# Patient Record
Sex: FEMALE | Race: WHITE | NOT HISPANIC OR LATINO | Employment: UNEMPLOYED | URBAN - NONMETROPOLITAN AREA
[De-identification: names, ages, dates, MRNs, and addresses within clinical notes are randomized per-mention and may not be internally consistent; named-entity substitution may affect disease eponyms.]

---
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
## 2023-04-07 RX ORDER — ETONOGESTREL/ETHINYL ESTRADIOL .12-.015MG
RING, VAGINAL VAGINAL
Qty: 1 EACH | Refills: 1 | Status: SHIPPED | OUTPATIENT
Start: 2023-04-07

## 2023-05-22 ENCOUNTER — OFFICE VISIT (OUTPATIENT)
Dept: FAMILY MEDICINE CLINIC | Facility: CLINIC | Age: 18
End: 2023-05-22
Payer: COMMERCIAL

## 2023-05-22 DIAGNOSIS — F41.9 ANXIETY: ICD-10-CM

## 2023-05-22 DIAGNOSIS — K59.00 CONSTIPATION, UNSPECIFIED CONSTIPATION TYPE: ICD-10-CM

## 2023-05-22 DIAGNOSIS — Z00.00 ROUTINE GENERAL MEDICAL EXAMINATION AT A HEALTH CARE FACILITY: Primary | ICD-10-CM

## 2023-05-22 NOTE — PROGRESS NOTES
Female Physical Note      Date: 2023   Patient Name: Mikayla Nascimento  : 2005   MRN: [id number]     Chief Complaint:    Chief Complaint   Patient presents with   • Annual Exam       History of Present Illness: Mikayla Nascimento is a 18 y.o. female who is here today for their annual health maintenance and physical.  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  She is currently prescribed 50 mg of Zoloft and NuvaRing.  She reports that she is tolerating both well and denies any side effects or concerns with her medications.  She denies any lingering anxiety or depression symptoms.  She denies any suicidal ideation.  She does report that when she experiences anxiety, she has physical manifestations such as chest pain.  She reports that she has not had episodes of chest pain or panic attack for multiple months.  She does have significant life stressors as her father has stage IV lung cancer.  She has been on Zoloft since .     She does report occasionally having some issues with constipation.  She reports that she had an episode of constipation 3 weeks ago.  Her last bowel movement was 2 days ago.  She denies any abdominal pain at present time.  She denies any other acute health concerns at present time.      Subjective      Review of Systems:   Review of Systems   Constitutional: Negative for appetite change, chills, fatigue, fever, unexpected weight gain and unexpected weight loss.   HENT: Negative for congestion, rhinorrhea, sore throat and trouble swallowing.    Eyes: Negative for blurred vision and double vision.   Respiratory: Negative for cough, chest tightness, shortness of breath and wheezing.    Cardiovascular: Negative for chest pain, palpitations and leg swelling.   Gastrointestinal: Positive for constipation. Negative for abdominal pain, anal bleeding, blood in stool, diarrhea, nausea and vomiting.   Genitourinary: Negative for dyspareunia, dysuria,  frequency, hematuria, menstrual problem, urinary incontinence, vaginal discharge and vaginal pain.   Musculoskeletal: Negative for arthralgias and myalgias.   Skin: Negative for rash.   Neurological: Negative for dizziness, syncope, weakness, light-headedness, numbness and headache.   Hematological: Does not bruise/bleed easily.   Psychiatric/Behavioral: Negative for self-injury, sleep disturbance, suicidal ideas and depressed mood. The patient is not nervous/anxious.        Past Medical History, Social History, Family History and Care Team were all reviewed with patient and updated as appropriate.     Medications:     Current Outpatient Medications:   •  NuvaRing 0.12-0.015 MG/24HR vaginal ring, INSERT 1 RING VAGINALLY FOR 3 WEEKS THEN REMOVE FOR 1 WEEK, Disp: 1 each, Rfl: 1  •  sertraline (ZOLOFT) 50 MG tablet, Take 1.5 tablets by mouth Daily., Disp: 45 tablet, Rfl: 6    Allergies:   No Known Allergies    Immunizations:  Health Maintenance Summary          Ordered - HEPATITIS C SCREENING (Once) Ordered on 5/22/2023    No completion, postpone, or frequency change history exists for this topic.          Overdue - COVID-19 Vaccine (4 - Booster for Pfizer series) Overdue since 1/31/2022 11/08/2022  Postponed until 2/28/2023 by Kelly Crain PA-C (Patient Refused)    12/06/2021  Imm Admin: COVID-19 (PFIZER) Purple Cap Monovalent    04/29/2021  Imm Admin: COVID-19 (PFIZER) PURPLE CAP    04/07/2021  Imm Admin: COVID-19 (PFIZER) PURPLE CAP          Ordered - CHLAMYDIA SCREENING (Yearly) Ordered on 5/22/2023    12/10/2021  Chlamydia trachomatis, JASEN component of NuSwab VG+ - Swab, Vagina    11/01/2018  Chlamydia trachomatis, JASEN component of NuSwab VG+ - Swab, Vagina          INFLUENZA VACCINE (Yearly - August to March) Next due on 8/1/2023 11/08/2022  Imm Admin: FluLaval/Fluzone >6mos    12/06/2021  Imm Admin: Influenza, Unspecified    12/06/2021  Imm Admin: Influenza Injectable Mdck Pf Quad    10/24/2018   Imm Admin: FluLaval/Fluzone >6mos          TDAP/TD VACCINES (2 - Td or Tdap) Next due on 3/21/2024    03/21/2014  Imm Admin: Tdap          ANNUAL PHYSICAL (Yearly) Next due on 5/22/2024 05/22/2023  Done          HPV VACCINES (Series Information) Completed    04/13/2017  Imm Admin: Hpv9    11/01/2016  Imm Admin: Hpv9    08/02/2016  Imm Admin: Hpv9          MENINGOCOCCAL VACCINE (Series Information) Completed    02/13/2019  Imm Admin: Meningococcal Conjugate          Pneumococcal Vaccine 0-64 (Series Information) Aged Out    No completion, postpone, or frequency change history exists for this topic.                 No orders of the defined types were placed in this encounter.       Colorectal Screening:  N/A  Last Completed Colonoscopy     This patient has no relevant Health Maintenance data.        Pap: No previous Pap       Last Completed Pap Smear     This patient has no relevant Health Maintenance data.         Mammogram: N/A  Last Completed Mammogram     This patient has no relevant Health Maintenance data.           CT for Smoker (Age 50-80, 20 pk yr): N/A  Bone Density/DEXA (Age 65 or high risk): N/A  Hep C (Age 18-79 once): Ordered today  HIV (Age 15-65 once): No results found for: HIV1X2  A1c: Ordered today  Lipid panel: Ordered today    The ASCVD Risk score (Anjana SOW, et al., 2019) failed to calculate for the following reasons:    The 2019 ASCVD risk score is only valid for ages 40 to 79    Dermatology: not regularly, no skin concerns at present time  Optometrist: Yearly, wears glasses  Dentist: Every 6 months  Seatbelt: Always wears    Tobacco Use: Low Risk    • Smoking Tobacco Use: Never   • Smokeless Tobacco Use: Never   • Passive Exposure: Never       Social History     Substance and Sexual Activity   Alcohol Use No        Social History     Substance and Sexual Activity   Drug Use Never        Diet/Physical activity: Diet: lots of fruits, home cooked meals, well-balanced, no excessive caffeine,  "drinks mainly water, Physical activity: walks for physical activity     Sexual Health: One time sexual partner, no new partner, no concerns for STIs, denies vaginal discharge, vaginal pain, postcoital bleeding  Menopause: N/A  Menstrual Cycles: Currently using NuvaRing for contraception, she is happy with her current method of contraception, reports periods are occurring regularly on NuvaRing, she is currently on her period, sees Dr. Parra health concerns    Depression: PHQ-2 Depression Screening  PHQ-9 Total Score: 0     Measures:   Advanced Care Planning:   Patient does not have an advance directive, declines further assistance.    Smoking Cessation:   N/A    Objective     Physical Exam:  Vital Signs:   Vitals:    05/22/23 1339   BP: 110/62   BP Location: Left arm   Patient Position: Sitting   Cuff Size: Adult   Pulse: 82   Resp: 16   Temp: 97 °F (36.1 °C)   TempSrc: Temporal   SpO2: 100%   Weight: 54.9 kg (121 lb)   Height: 157.5 cm (62\")     Body mass index is 22.13 kg/m².     Physical Exam  Vitals and nursing note reviewed.   Constitutional:       General: She is not in acute distress.     Appearance: Normal appearance. She is not ill-appearing, toxic-appearing or diaphoretic.   HENT:      Head: Atraumatic.   Eyes:      Extraocular Movements: Extraocular movements intact.   Neck:      Comments: No thyromegaly or masses appreciated.  Cardiovascular:      Rate and Rhythm: Normal rate and regular rhythm.      Heart sounds: No murmur heard.    No friction rub. No gallop.   Pulmonary:      Effort: Pulmonary effort is normal. No respiratory distress.      Breath sounds: No wheezing, rhonchi or rales.   Abdominal:      General: There is no distension.      Palpations: Abdomen is soft.      Tenderness: There is no abdominal tenderness. There is no guarding or rebound.   Musculoskeletal:      Cervical back: Normal range of motion.      Right lower leg: No edema.      Left lower leg: No edema.   Skin:     General: Skin is " warm.   Neurological:      Mental Status: She is alert.      Gait: Gait normal.   Psychiatric:         Mood and Affect: Mood normal.         Thought Content: Thought content normal.         Assessment / Plan      Assessment/Plan:   Diagnoses and all orders for this visit:    1. Routine general medical examination at a health care facility (Primary)  -Routine health maintenance labs ordered at present time.  -BMI is within normal range.  -Physical exam within normal limits.  -Did discuss age-appropriate preventative health counseling such as abstinence from excessive alcohol use, tobacco use, and drug use, symptoms of STIs, adherence to NuvaRing for protection against unwanted pregnancy, importance of daily sunscreen use, importance of daily seatbelt use, abstinence from texting and driving and drinking and driving, never getting in the car with a friend who has been drinking, mental health, importance of yearly optometry appointments and twice yearly dental appointments etc.  -Patient verbalizes understanding and has no further questions.  -     CBC (No Diff); Future  -     Comprehensive Metabolic Panel; Future  -     Hemoglobin A1c; Future  -     Hepatitis C Antibody; Future  -     Lipid Panel; Future  -     TSH Rfx On Abnormal To Free T4; Future  -     Vitamin B12; Future  -     Urinalysis With Culture If Indicated -; Future  -     Chlamydia trachomatis, Neisseria gonorrhoeae, PCR - Urine, Urine, Random Void; Future  -     CBC (No Diff)  -     Comprehensive Metabolic Panel  -     Hemoglobin A1c  -     Hepatitis C Antibody  -     Lipid Panel  -     TSH Rfx On Abnormal To Free T4  -     Vitamin B12  -     Urinalysis With Culture If Indicated -  -     Chlamydia trachomatis, Neisseria gonorrhoeae, PCR - Urine, Urine, Random Void    2. Constipation, unspecified constipation type  -Patient does report issues with intermittent constipation.  -We did discuss lifestyle modifications to help with constipation such as  increasing fluid intake and increasing physical activity.  -We discussed that if these 2 measures alone do not help to relieve constipation, she may benefit from MiraLAX use.  We discussed mechanism of action of MiraLAX and how to adjust dose of medication to find dose that works well for her.  -If she continues to have issues or if symptoms are not relieved with these measures, I encouraged her to return to care.  -She verbalizes understanding and has no further questions.    3. Anxiety    -PHQ-2 Total Score: 0  -NORMA 7 Total Score: 7   -Denies SI/HI.  -PHQ-9 and NORMA-7 scores are within acceptable range.  We did discuss possibly dose escalating Zoloft, however at this time she would like to remain on current dose of Zoloft.  -She has been on Zoloft for several years and has tolerated it well.  -We will keep patient on current dose of Zoloft.  -I encouraged the patient to return to care sooner than follow-up appointment if symptoms worsen or new symptoms develop.  -Patient verbalizes understanding has no further questions.    Healthcare Maintenance:  Counseling provided based on age appropriate USPSTF guidelines.  BMI is within normal parameters. No other follow-up for BMI required.    Mikayla Nascimento voices understanding and acceptance of this advice and will call back with any further questions or concerns. AVS with preventive healthcare tips printed for patient.     Follow Up:   Return in about 6 months (around 11/22/2023) for Recheck.      At [facility], we believe that sharing information builds trust and better relationships. You are receiving this note because you recently visited [facility]. It is possible you will see health information before a provider has talked with you about it. This kind of information can be easy to misunderstand. To help you fully understand what it means for your health, we urge you to discuss this note with your provider.    Mireya Crain PA-C  [facility]

## 2023-05-23 ENCOUNTER — OFFICE VISIT (OUTPATIENT)
Dept: FAMILY MEDICINE CLINIC | Facility: CLINIC | Age: 18
End: 2023-05-23
Payer: COMMERCIAL

## 2023-05-23 VITALS
RESPIRATION RATE: 16 BRPM | HEIGHT: 62 IN | WEIGHT: 121 LBS | DIASTOLIC BLOOD PRESSURE: 70 MMHG | OXYGEN SATURATION: 100 % | BODY MASS INDEX: 22.26 KG/M2 | SYSTOLIC BLOOD PRESSURE: 118 MMHG | HEART RATE: 72 BPM | TEMPERATURE: 98 F

## 2023-05-23 VITALS
WEIGHT: 121 LBS | OXYGEN SATURATION: 100 % | SYSTOLIC BLOOD PRESSURE: 110 MMHG | HEIGHT: 62 IN | HEART RATE: 82 BPM | TEMPERATURE: 97 F | DIASTOLIC BLOOD PRESSURE: 62 MMHG | RESPIRATION RATE: 16 BRPM | BODY MASS INDEX: 22.26 KG/M2

## 2023-05-23 DIAGNOSIS — J02.0 STREP PHARYNGITIS: Primary | ICD-10-CM

## 2023-05-23 LAB
ALBUMIN SERPL-MCNC: 4.3 G/DL (ref 3.5–5.2)
ALBUMIN/GLOB SERPL: 1.4 G/DL
ALP SERPL-CCNC: 58 U/L (ref 39–117)
ALT SERPL W P-5'-P-CCNC: 10 U/L (ref 1–33)
ANION GAP SERPL CALCULATED.3IONS-SCNC: 7.9 MMOL/L (ref 5–15)
AST SERPL-CCNC: 15 U/L (ref 1–32)
BILIRUB SERPL-MCNC: 0.3 MG/DL (ref 0–1.2)
BUN SERPL-MCNC: 9 MG/DL (ref 6–20)
BUN/CREAT SERPL: 12.7 (ref 7–25)
CALCIUM SPEC-SCNC: 9.4 MG/DL (ref 8.6–10.5)
CHLORIDE SERPL-SCNC: 104 MMOL/L (ref 98–107)
CHOLEST SERPL-MCNC: 167 MG/DL (ref 0–200)
CO2 SERPL-SCNC: 28.1 MMOL/L (ref 22–29)
CREAT SERPL-MCNC: 0.71 MG/DL (ref 0.57–1)
EGFRCR SERPLBLD CKD-EPI 2021: 125 ML/MIN/1.73
EXPIRATION DATE: ABNORMAL
GLOBULIN UR ELPH-MCNC: 3 GM/DL
GLUCOSE SERPL-MCNC: 68 MG/DL (ref 65–99)
HBA1C MFR BLD: 4.8 % (ref 4.8–5.6)
HCV AB SER DONR QL: NORMAL
HDLC SERPL-MCNC: 53 MG/DL (ref 40–60)
INTERNAL CONTROL: ABNORMAL
LDLC SERPL CALC-MCNC: 97 MG/DL (ref 0–100)
LDLC/HDLC SERPL: 1.8 {RATIO}
Lab: ABNORMAL
POTASSIUM SERPL-SCNC: 3.9 MMOL/L (ref 3.5–5.2)
PROT SERPL-MCNC: 7.3 G/DL (ref 6–8.5)
S PYO AG THROAT QL: POSITIVE
SODIUM SERPL-SCNC: 140 MMOL/L (ref 136–145)
TRIGL SERPL-MCNC: 92 MG/DL (ref 0–150)
TSH SERPL DL<=0.05 MIU/L-ACNC: 0.61 UIU/ML (ref 0.27–4.2)
VIT B12 BLD-MCNC: 589 PG/ML (ref 211–946)
VLDLC SERPL-MCNC: 17 MG/DL (ref 5–40)

## 2023-05-23 PROCEDURE — 87880 STREP A ASSAY W/OPTIC: CPT

## 2023-05-23 PROCEDURE — 81001 URINALYSIS AUTO W/SCOPE: CPT

## 2023-05-23 PROCEDURE — 87491 CHLMYD TRACH DNA AMP PROBE: CPT

## 2023-05-23 PROCEDURE — 82607 VITAMIN B-12: CPT

## 2023-05-23 PROCEDURE — 86803 HEPATITIS C AB TEST: CPT

## 2023-05-23 PROCEDURE — 87591 N.GONORRHOEAE DNA AMP PROB: CPT

## 2023-05-23 PROCEDURE — 99213 OFFICE O/P EST LOW 20 MIN: CPT

## 2023-05-23 PROCEDURE — 80061 LIPID PANEL: CPT

## 2023-05-23 PROCEDURE — 83036 HEMOGLOBIN GLYCOSYLATED A1C: CPT

## 2023-05-23 PROCEDURE — 80050 GENERAL HEALTH PANEL: CPT

## 2023-05-23 RX ORDER — AMOXICILLIN 500 MG/1
500 CAPSULE ORAL 2 TIMES DAILY
Qty: 20 CAPSULE | Refills: 0 | Status: SHIPPED | OUTPATIENT
Start: 2023-05-23 | End: 2023-06-02

## 2023-05-23 NOTE — PROGRESS NOTES
Office Note     Name: Mikayla Nascimento    : 2005     MRN: [id number]     Chief Complaint  Sore throat     History of Present Illness:  Mikayla Nascimento is a 18 y.o. female who presents today for symptoms of sore throat.  She reports that she began experiencing a sore throat last night.  She denies trouble swallowing.  She is continuing to eat and drink well.  She reports she did cough up a tonsil stone last night.  She has had no fever or chills that she is aware of.  She has a small amount of congestion, but denies runny nose, cough, shortness of air, or chest tightness.  She denies any abdominal symptoms.  She denies any headache or body aches.  She denies any known sick contacts.  She has not taken any over-the-counter medications.  She denies any known antibiotic allergies.        Subjective     Review of Systems:   Review of Systems   Constitutional: Negative for appetite change, chills and fever.   HENT: Positive for congestion and sore throat. Negative for rhinorrhea and trouble swallowing.    Respiratory: Negative for cough, choking, chest tightness, shortness of breath and wheezing.    Cardiovascular: Negative for chest pain.   Gastrointestinal: Negative for abdominal pain, constipation, diarrhea, nausea and vomiting.   Musculoskeletal: Negative for arthralgias and myalgias.   Neurological: Negative for dizziness, weakness, light-headedness and headache.       I have reviewed the patients family history, social history, past medical history, past surgical history and have updated it as appropriate.     Past Medical History:   Past Medical History:   Diagnosis Date   • Abnormal menstrual periods    • Anxiety    • Constipation    • Depression    • Mood swings        Past Surgical History:   Past Surgical History:   Procedure Laterality Date   • EAR TUBES     • EYE SURGERY Bilateral     LAZY EYE   • WISDOM TOOTH EXTRACTION             Family History:   Family History   Problem Relation Age  of Onset   • Diabetes Mother    • Hypertension Father    • Lung cancer Father    • No Known Problems Sister    • No Known Problems Brother    • Breast cancer Maternal Grandmother    • No Known Problems Maternal Grandfather    • Breast cancer Paternal Grandmother    • Stroke Paternal Grandmother    • Heart attack Paternal Grandfather    • No Known Problems Daughter    • No Known Problems Son    • No Known Problems Maternal Aunt    • No Known Problems Maternal Uncle    • No Known Problems Paternal Aunt    • No Known Problems Paternal Uncle    • Breast cancer Other    • Stroke Other    • Diabetes Other    • Hypertension Other    • Heart attack Other        Social History:   Social History     Socioeconomic History   • Marital status: Significant Other   Tobacco Use   • Smoking status: Never     Passive exposure: Never   • Smokeless tobacco: Never   Vaping Use   • Vaping Use: Never used   Substance and Sexual Activity   • Alcohol use: No   • Drug use: Never   • Sexual activity: Yes     Partners: Male     Birth control/protection: Vaginal contraceptive ring       Immunizations:   Immunization History   Administered Date(s) Administered   • COVID-19 (PFIZER) Purple Cap Monovalent 04/07/2021, 04/29/2021, 12/06/2021   • FluLaval/Fluzone >6mos 10/24/2018, 11/08/2022   • Hep A, 2 Dose 10/24/2018   • Hpv9 08/02/2016, 11/01/2016, 04/13/2017   • Influenza Injectable Mdck Pf Quad 12/06/2021   • Influenza, Unspecified 12/06/2021   • Meningococcal Conjugate 02/13/2019   • Tdap 03/21/2014        Medications:     Current Outpatient Medications:   •  amoxicillin (AMOXIL) 500 MG capsule, Take 1 capsule by mouth 2 (Two) Times a Day for 10 days., Disp: 20 capsule, Rfl: 0  •  NuvaRing 0.12-0.015 MG/24HR vaginal ring, INSERT 1 RING VAGINALLY FOR 3 WEEKS THEN REMOVE FOR 1 WEEK, Disp: 1 each, Rfl: 1  •  sertraline (ZOLOFT) 50 MG tablet, Take 1.5 tablets by mouth Daily., Disp: 45 tablet, Rfl: 6    Allergies:   No Known Allergies    Objective  "    Vital Signs  Vitals:    05/23/23 0959   BP: 118/70   BP Location: Left arm   Patient Position: Sitting   Cuff Size: Adult   Pulse: 72   Resp: 16   Temp: 98 °F (36.7 °C)   TempSrc: Temporal   SpO2: 100%   Weight: 54.9 kg (121 lb)   Height: 157.5 cm (62\")     Estimated body mass index is 22.13 kg/m² as calculated from the following:    Height as of this encounter: 157.5 cm (62\").    Weight as of this encounter: 54.9 kg (121 lb).    BMI is within normal parameters. No other follow-up for BMI required.      Physical Exam  Vitals and nursing note reviewed.   Constitutional:       General: She is not in acute distress.     Appearance: Normal appearance. She is not ill-appearing, toxic-appearing or diaphoretic.   HENT:      Head: Normocephalic and atraumatic.      Right Ear: Ear canal and external ear normal. Tympanic membrane is not perforated, erythematous, retracted or bulging.      Left Ear: Ear canal and external ear normal. Tympanic membrane is not perforated, erythematous, retracted or bulging.      Nose: No congestion or rhinorrhea.      Mouth/Throat:      Pharynx: Uvula midline. Posterior oropharyngeal erythema present. No pharyngeal swelling or oropharyngeal exudate.      Tonsils: No tonsillar exudate. 1+ on the right. 1+ on the left.   Eyes:      General:         Right eye: No discharge.         Left eye: No discharge.      Extraocular Movements: Extraocular movements intact.   Cardiovascular:      Rate and Rhythm: Normal rate and regular rhythm.      Heart sounds: No murmur heard.    No friction rub. No gallop.   Pulmonary:      Effort: No respiratory distress.      Breath sounds: No wheezing, rhonchi or rales.   Musculoskeletal:      Cervical back: Normal range of motion. No rigidity.   Lymphadenopathy:      Cervical: Cervical adenopathy (Anterior) present.   Skin:     General: Skin is warm.   Neurological:      Mental Status: She is alert.      Gait: Gait normal.   Psychiatric:         Mood and Affect: " Mood normal.         Thought Content: Thought content normal.          Assessment and Plan     1. Strep pharyngitis  -At this time, we will treat with amoxicillin.  The patient should finish the full course of antibiotics, even if they symptomatically feeling better.  -Supportive options for treatment include Tylenol or Motrin as needed for fever and pain, throat lozenges, warm salt water gargles, increasing fluid intake, popsicles or other soothing foods to the throat.  -They should not share cups with anyone else in the house.  I also encouraged to change their toothbrush to prevent reinfection.  -We discussed that they will be considered contagious for 24 hours after her first dose of antibiotic.  After that time, she may resume school or other activities.  -If symptoms worsen or fail to improve or they develop new symptoms, I encouraged the patient to call or return to care.  -The parent verbalizes understanding and had no further questions  - POCT rapid strep A  - amoxicillin (AMOXIL) 500 MG capsule; Take 1 capsule by mouth 2 (Two) Times a Day for 10 days.  Dispense: 20 capsule; Refill: 0       Follow Up  Return if symptoms worsen or fail to improve.    Mireya Crain PA-C  [facility] [place]

## 2023-05-24 LAB
BACTERIA UR QL AUTO: ABNORMAL /HPF
BILIRUB UR QL STRIP: NEGATIVE
C TRACH RRNA SPEC QL NAA+PROBE: NEGATIVE
CLARITY UR: CLEAR
COLOR UR: YELLOW
DEPRECATED RDW RBC AUTO: 40.4 FL (ref 37–54)
ERYTHROCYTE [DISTWIDTH] IN BLOOD BY AUTOMATED COUNT: 12.7 % (ref 12.3–15.4)
GLUCOSE UR STRIP-MCNC: NEGATIVE MG/DL
HCT VFR BLD AUTO: 44 % (ref 34–46.6)
HGB BLD-MCNC: 15.1 G/DL (ref 12–15.9)
HGB UR QL STRIP.AUTO: ABNORMAL
HYALINE CASTS UR QL AUTO: ABNORMAL /LPF
KETONES UR QL STRIP: NEGATIVE
LEUKOCYTE ESTERASE UR QL STRIP.AUTO: ABNORMAL
MCH RBC QN AUTO: 29.8 PG (ref 26.6–33)
MCHC RBC AUTO-ENTMCNC: 34.3 G/DL (ref 31.5–35.7)
MCV RBC AUTO: 86.8 FL (ref 79–97)
N GONORRHOEA RRNA SPEC QL NAA+PROBE: NEGATIVE
NITRITE UR QL STRIP: NEGATIVE
PH UR STRIP.AUTO: 7 [PH] (ref 5–8)
PLATELET # BLD AUTO: 201 10*3/MM3 (ref 140–450)
PMV BLD AUTO: 10.6 FL (ref 6–12)
PROT UR QL STRIP: NEGATIVE
RBC # BLD AUTO: 5.07 10*6/MM3 (ref 3.77–5.28)
RBC # UR STRIP: ABNORMAL /HPF
REF LAB TEST METHOD: ABNORMAL
SP GR UR STRIP: <=1.005 (ref 1–1.03)
SQUAMOUS #/AREA URNS HPF: ABNORMAL /HPF
UROBILINOGEN UR QL STRIP: ABNORMAL
WBC # UR STRIP: ABNORMAL /HPF
WBC NRBC COR # BLD: 8.19 10*3/MM3 (ref 3.4–10.8)

## 2023-08-10 ENCOUNTER — OFFICE VISIT (OUTPATIENT)
Dept: FAMILY MEDICINE CLINIC | Facility: CLINIC | Age: 18
End: 2023-08-10
Payer: COMMERCIAL

## 2023-08-10 VITALS
OXYGEN SATURATION: 98 % | WEIGHT: 125 LBS | RESPIRATION RATE: 16 BRPM | TEMPERATURE: 98 F | BODY MASS INDEX: 23 KG/M2 | HEART RATE: 72 BPM | DIASTOLIC BLOOD PRESSURE: 70 MMHG | SYSTOLIC BLOOD PRESSURE: 110 MMHG | HEIGHT: 62 IN

## 2023-08-10 DIAGNOSIS — J02.0 STREP PHARYNGITIS: Primary | ICD-10-CM

## 2023-08-10 LAB
EXPIRATION DATE: ABNORMAL
INTERNAL CONTROL: ABNORMAL
Lab: ABNORMAL
S PYO AG THROAT QL: POSITIVE

## 2023-08-10 PROCEDURE — 87880 STREP A ASSAY W/OPTIC: CPT | Performed by: FAMILY MEDICINE

## 2023-08-10 PROCEDURE — 99213 OFFICE O/P EST LOW 20 MIN: CPT | Performed by: FAMILY MEDICINE

## 2023-08-10 RX ORDER — AMOXICILLIN 875 MG/1
875 TABLET, COATED ORAL 2 TIMES DAILY
Qty: 20 TABLET | Refills: 0 | Status: SHIPPED | OUTPATIENT
Start: 2023-08-10

## 2023-08-10 NOTE — PROGRESS NOTES
Follow Up Office Visit      Date: 08/10/2023   Patient Name: Mikayla Nascimento  : 2005   MRN: [id number]     Chief Complaint:    Chief Complaint   Patient presents with    Sore Throat       History of Present Illness: Mikayla Nascimento is a 18 y.o. female who is here today for assessment of sore throat.  Patient states that she has had a sore throat for approximately 24 hours.  She denies any fever at present time.  She has not had any headaches, nausea, vomiting, rash etc.  She has had some tender anterior chain lymphadenopathy.  She has not been around anyone with similar symptomatology.  No other issues have been noted currently.  Patient denies any change in activity, appetite or sleep.    Subjective      Review of Systems:   Review of Systems   Constitutional:  Negative for activity change, appetite change, fatigue and fever.   HENT:  Positive for sore throat and swollen glands. Negative for congestion.    Respiratory:  Negative for cough and shortness of breath.    Cardiovascular:  Negative for chest pain, palpitations and leg swelling.   Gastrointestinal:  Negative for abdominal pain, constipation, diarrhea, nausea and vomiting.   Genitourinary:  Negative for dysuria, flank pain, frequency and urgency.   Musculoskeletal:  Negative for arthralgias and myalgias.   Neurological:  Negative for dizziness, weakness and memory problem.   Psychiatric/Behavioral:  Negative for sleep disturbance.      I have reviewed the patients family history, social history, past medical history, past surgical history and have updated it as appropriate.     Medications:     Current Outpatient Medications:     NuvaRing 0.12-0.015 MG/24HR vaginal ring, INSERT 1 RING VAGINALLY FOR 3 WEEKS THEN REMOVE FOR 1 WEEK, Disp: 1 each, Rfl: 1    sertraline (ZOLOFT) 50 MG tablet, Take 1.5 tablets by mouth Daily., Disp: 135 tablet, Rfl: 0    amoxicillin (AMOXIL) 875 MG tablet, Take 1 tablet by mouth 2 (Two) Times a Day., Disp: 20  "tablet, Rfl: 0    Allergies:   No Known Allergies    Immunizations:   Immunization History   Administered Date(s) Administered    COVID-19 (PFIZER) Purple Cap Monovalent 04/07/2021, 04/29/2021, 12/06/2021    Fluzone >6mos 10/24/2018, 11/08/2022    Hep A, 2 Dose 10/24/2018    Hpv9 08/02/2016, 11/01/2016, 04/13/2017    Influenza Injectable Mdck Pf Quad 12/06/2021    Influenza, Unspecified 12/06/2021    Meningococcal Conjugate 02/13/2019    Tdap 03/21/2014        Objective     Physical Exam: Please see above  Vital Signs:   Vitals:    08/10/23 1055   BP: 110/70   BP Location: Right arm   Patient Position: Sitting   Cuff Size: Adult   Pulse: 72   Resp: 16   Temp: 98 øF (36.7 øC)   TempSrc: Temporal   SpO2: 98%   Weight: 56.7 kg (125 lb)   Height: 157.5 cm (62.01\")     Body mass index is 22.86 kg/mý.  BMI is within normal parameters. No other follow-up for BMI required.       Physical Exam  Vitals and nursing note reviewed.   Constitutional:       Appearance: Normal appearance.   HENT:      Head: Normocephalic and atraumatic.      Nose: Nose normal.      Mouth/Throat:      Pharynx: Oropharynx is clear. Posterior oropharyngeal erythema present.      Tonsils: Tonsillar exudate present. No tonsillar abscesses.   Eyes:      Extraocular Movements: Extraocular movements intact.      Pupils: Pupils are equal, round, and reactive to light.   Neck:      Thyroid: No thyroid mass or thyromegaly.      Trachea: Trachea normal.   Cardiovascular:      Rate and Rhythm: Normal rate and regular rhythm.      Pulses: Normal pulses. No decreased pulses.      Heart sounds: Normal heart sounds.   Pulmonary:      Effort: Pulmonary effort is normal.      Breath sounds: Normal breath sounds.   Abdominal:      General: Abdomen is flat. Bowel sounds are normal.      Palpations: Abdomen is soft.      Tenderness: There is no abdominal tenderness.   Musculoskeletal:      Cervical back: Neck supple.      Right lower leg: No edema.      Left lower leg: " No edema.   Lymphadenopathy:      Cervical: No cervical adenopathy.   Skin:     General: Skin is warm and dry.   Neurological:      General: No focal deficit present.      Mental Status: She is alert and oriented to person, place, and time.      Sensory: Sensation is intact.      Motor: Motor function is intact.      Coordination: Coordination is intact.   Psychiatric:         Attention and Perception: Attention normal.         Mood and Affect: Mood normal.         Speech: Speech normal.         Behavior: Behavior normal.       Procedures    Results:   Labs:   Hemoglobin A1C   Date Value Ref Range Status   05/23/2023 4.80 4.80 - 5.60 % Final     TSH   Date Value Ref Range Status   05/23/2023 0.609 0.270 - 4.200 uIU/mL Final        POCT Results (if applicable):       Imaging:   No valid procedures specified.    Measures:   Advanced Care Planning:   Did not discuss.    Smoking Cessation:   Non-smoker.    Assessment / Plan      Assessment/Plan:   Diagnoses and all orders for this visit:    1. Strep pharyngitis (Primary)  Patient did have signs and symptoms consistent with strep pharyngitis.  She had a swab obtained that was positive for group A strep.  We will start her on antibiotics that she will complete 10 day course.  Patient understands that she is contagious for the next 24 hours.  She also understands that she needs to complete the full 10-day course to prevent rheumatic fever.  This is a second time that she has had strep throat within the previous 3 months.  If this is recurrent problem we will we will consider suppression.  -     POC Rapid Strep A  -     amoxicillin (AMOXIL) 875 MG tablet; Take 1 tablet by mouth 2 (Two) Times a Day.  Dispense: 20 tablet; Refill: 0        Follow Up:   No follow-ups on file.      At [facility], we believe that sharing information builds trust and better relationships. You are receiving this note because you recently visited [facility]. It is possible you will see  health information before a provider has talked with you about it. This kind of information can be easy to misunderstand. To help you fully understand what it means for your health, we urge you to discuss this note with your provider.    Desmond Hays MD  [facility]

## 2023-08-22 ENCOUNTER — OFFICE VISIT (OUTPATIENT)
Dept: FAMILY MEDICINE CLINIC | Facility: CLINIC | Age: 18
End: 2023-08-22
Payer: COMMERCIAL

## 2023-08-22 VITALS
HEIGHT: 62 IN | HEART RATE: 89 BPM | BODY MASS INDEX: 23.55 KG/M2 | OXYGEN SATURATION: 98 % | WEIGHT: 128 LBS | TEMPERATURE: 99.1 F | RESPIRATION RATE: 16 BRPM | SYSTOLIC BLOOD PRESSURE: 110 MMHG | DIASTOLIC BLOOD PRESSURE: 62 MMHG

## 2023-08-22 DIAGNOSIS — J02.0 STREP PHARYNGITIS: Primary | ICD-10-CM

## 2023-08-22 LAB
EXPIRATION DATE: ABNORMAL
EXPIRATION DATE: NORMAL
FLUAV AG UPPER RESP QL IA.RAPID: NOT DETECTED
FLUBV AG UPPER RESP QL IA.RAPID: NOT DETECTED
INTERNAL CONTROL: ABNORMAL
INTERNAL CONTROL: NORMAL
Lab: ABNORMAL
Lab: NORMAL
S PYO AG THROAT QL: POSITIVE
SARS-COV-2 AG UPPER RESP QL IA.RAPID: NOT DETECTED

## 2023-08-22 PROCEDURE — 87428 SARSCOV & INF VIR A&B AG IA: CPT

## 2023-08-22 PROCEDURE — 87880 STREP A ASSAY W/OPTIC: CPT

## 2023-08-22 PROCEDURE — 99213 OFFICE O/P EST LOW 20 MIN: CPT

## 2023-08-22 RX ORDER — AZITHROMYCIN 250 MG/1
TABLET, FILM COATED ORAL
Qty: 6 TABLET | Refills: 0 | Status: SHIPPED | OUTPATIENT
Start: 2023-08-22 | End: 2023-08-26

## 2023-08-22 NOTE — PROGRESS NOTES
Office Note     Name: Mikayla Nascimento    : 2005     MRN: [id number]     Chief Complaint  Sore throat and headache    History of Present Illness:  Mikayla Nascimento is a 18 y.o. female who presents today for symptoms of sore throat and headache. She started feeling sick yesterday. She started experiencing a mild sore throat but it has progressed to more significant pain. She denies trouble swallowing.  She began having a headache last night. She describes the headache as a frontal pressure. No sensitivity to light or sound. No N/V.  She denies worst headache of life. She denies known fever but has had chills. She does report fatigue. She also reports congestion and runny nose. She reports mild body aches diffusely. At home, she has increased fluid intake but denies OTC meds.  Of note, she was diagnosed with strep 10 days ago and did complete a 10-day course of amoxicillin.  She reports that her symptoms completely resolved before these symptoms began yesterday.  She does report that she had a mildly erythematous, diffuse patchy rash after taking the amoxicillin.  She denies any new soaps, foods, or detergents.  She reports that she took some Benadryl at home and the rash has completely resolved.      Subjective     Review of Systems:   Review of Systems   Constitutional:  Positive for chills and fatigue. Negative for appetite change and fever.   HENT:  Positive for congestion, rhinorrhea and sore throat. Negative for trouble swallowing.    Eyes:  Negative for blurred vision, double vision and photophobia.   Respiratory:  Negative for cough, chest tightness, shortness of breath and wheezing.    Cardiovascular:  Negative for chest pain.   Gastrointestinal:  Negative for abdominal pain, constipation, diarrhea, nausea and vomiting.   Musculoskeletal:  Positive for arthralgias and myalgias.   Neurological:  Positive for headache. Negative for dizziness, syncope, weakness and light-headedness.     I have  reviewed the patients family history, social history, past medical history, past surgical history and have updated it as appropriate.     Past Medical History:   Past Medical History:   Diagnosis Date    Abnormal menstrual periods     Anxiety     Constipation     Depression     Mood swings        Past Surgical History:   Past Surgical History:   Procedure Laterality Date    EAR TUBES      EYE SURGERY Bilateral     LAZY EYE    WISDOM TOOTH EXTRACTION      2019       Family History:   Family History   Problem Relation Age of Onset    Diabetes Mother     Hypertension Father     Lung cancer Father     No Known Problems Sister     No Known Problems Brother     Breast cancer Maternal Grandmother     No Known Problems Maternal Grandfather     Breast cancer Paternal Grandmother     Stroke Paternal Grandmother     Heart attack Paternal Grandfather     No Known Problems Daughter     No Known Problems Son     No Known Problems Maternal Aunt     No Known Problems Maternal Uncle     No Known Problems Paternal Aunt     No Known Problems Paternal Uncle     Breast cancer Other     Stroke Other     Diabetes Other     Hypertension Other     Heart attack Other        Social History:   Social History     Socioeconomic History    Marital status: Significant Other   Tobacco Use    Smoking status: Never     Passive exposure: Never    Smokeless tobacco: Never   Vaping Use    Vaping Use: Never used   Substance and Sexual Activity    Alcohol use: No    Drug use: Never    Sexual activity: Yes     Partners: Male     Birth control/protection: Condom, Vaginal contraceptive ring       Immunizations:   Immunization History   Administered Date(s) Administered    COVID-19 (PFIZER) Purple Cap Monovalent 04/07/2021, 04/29/2021, 12/06/2021    Fluzone >6mos 10/24/2018, 11/08/2022    Hep A, 2 Dose 10/24/2018    Hpv9 08/02/2016, 11/01/2016, 04/13/2017    Influenza Injectable Mdck Pf Quad 12/06/2021    Influenza, Unspecified 12/06/2021    Meningococcal  "Conjugate 02/13/2019    Tdap 03/21/2014        Medications:     Current Outpatient Medications:     azithromycin (Zithromax) 250 MG tablet, Take 2 tablets by mouth Daily for 1 day, THEN 1 tablet Daily for 4 days., Disp: 6 tablet, Rfl: 0    NuvaRing 0.12-0.015 MG/24HR vaginal ring, INSERT 1 RING VAGINALLY FOR 3 WEEKS THEN REMOVE FOR 1 WEEK, Disp: 1 each, Rfl: 1    sertraline (ZOLOFT) 50 MG tablet, Take 1.5 tablets by mouth Daily., Disp: 135 tablet, Rfl: 0    Allergies:   Allergies   Allergen Reactions    Amoxicillin Rash       Objective     Vital Signs  Vitals:    08/22/23 1006   BP: 110/62   BP Location: Left arm   Patient Position: Sitting   Cuff Size: Adult   Pulse: 89   Resp: 16   Temp: 99.1 øF (37.3 øC)   TempSrc: Temporal   SpO2: 98%   Weight: 58.1 kg (128 lb)   Height: 157.5 cm (62\")   PainSc:   6   PainLoc: Head     Estimated body mass index is 23.41 kg/mý as calculated from the following:    Height as of this encounter: 157.5 cm (62\").    Weight as of this encounter: 58.1 kg (128 lb).    BMI is within normal parameters. No other follow-up for BMI required.      Physical Exam  Vitals and nursing note reviewed.   Constitutional:       General: She is not in acute distress.     Appearance: Normal appearance. She is not ill-appearing, toxic-appearing or diaphoretic.   HENT:      Head: Normocephalic and atraumatic.      Right Ear: Ear canal and external ear normal. Tympanic membrane is not perforated, erythematous, retracted or bulging.      Left Ear: Ear canal and external ear normal. Tympanic membrane is not perforated, erythematous, retracted or bulging.      Nose: No congestion or rhinorrhea.      Mouth/Throat:      Pharynx: Uvula midline. Posterior oropharyngeal erythema present. No pharyngeal swelling or oropharyngeal exudate.      Tonsils: No tonsillar exudate. 2+ on the right. 2+ on the left.   Eyes:      General:         Right eye: No discharge.         Left eye: No discharge.      Extraocular Movements: " Extraocular movements intact.      Pupils: Pupils are equal, round, and reactive to light.   Cardiovascular:      Rate and Rhythm: Normal rate and regular rhythm.      Heart sounds: No murmur heard.    No friction rub. No gallop.   Pulmonary:      Effort: No respiratory distress.      Breath sounds: No wheezing, rhonchi or rales.   Musculoskeletal:      Cervical back: Normal range of motion. No rigidity.   Lymphadenopathy:      Cervical: Cervical adenopathy (Bilateral anterior adenopathy) present.   Skin:     General: Skin is warm.   Neurological:      Mental Status: She is alert and oriented to person, place, and time.      Cranial Nerves: No dysarthria or facial asymmetry.      Motor: No tremor or abnormal muscle tone.      Coordination: Coordination normal.      Gait: Gait normal.   Psychiatric:         Mood and Affect: Mood normal.         Thought Content: Thought content normal.        Assessment and Plan     1. Strep pharyngitis  -She is positive for strep, negative for influenza and COVID.  -Because her symptoms fully resolved and then recurred, I do not think this is a false positive from her previous infection.  -At this time, we will treat with azithromycin.  The patient should finish the full course of antibiotics, even if they symptomatically feel better.  -We did discuss that she may benefit from taking a probiotic while on the azithromycin, as she will have been on 2 antibiotics close together.  If she has any diarrhea or yeast infection symptoms, she has been encouraged to call or return to care.  -Supportive options for treatment include Tylenol or Motrin as needed for fever and pain, throat lozenges, warm salt water gargles, increasing fluid intake, popsicles or other soothing foods to the throat.  -They should not share cups with anyone else in the house.  She should also change her toothbrush.  -We discussed that they will be considered contagious for 24 hours after her first dose of antibiotic.   After that time, she may resume school or other activities.  -If symptoms worsen or fail to improve or they develop new symptoms, I encouraged the patient to call or return to care.  - POCT SARS-CoV-2 Antigen GUILLERMINA + Flu  - POCT rapid strep A  - azithromycin (Zithromax) 250 MG tablet; Take 2 tablets by mouth Daily for 1 day, THEN 1 tablet Daily for 4 days.  Dispense: 6 tablet; Refill: 0       Follow Up  Return if symptoms worsen or fail to improve.    Mireya Crain PA-C  [facility] – [place] [place]

## 2023-10-16 DIAGNOSIS — F32.A ANXIETY AND DEPRESSION: ICD-10-CM

## 2023-10-16 DIAGNOSIS — F41.9 ANXIETY AND DEPRESSION: ICD-10-CM

## 2023-11-07 ENCOUNTER — OFFICE VISIT (OUTPATIENT)
Dept: FAMILY MEDICINE CLINIC | Facility: CLINIC | Age: 18
End: 2023-11-07
Payer: COMMERCIAL

## 2023-11-07 VITALS
TEMPERATURE: 100.5 F | HEIGHT: 62 IN | RESPIRATION RATE: 16 BRPM | DIASTOLIC BLOOD PRESSURE: 64 MMHG | HEART RATE: 98 BPM | OXYGEN SATURATION: 98 % | WEIGHT: 128.8 LBS | BODY MASS INDEX: 23.7 KG/M2 | SYSTOLIC BLOOD PRESSURE: 100 MMHG

## 2023-11-07 DIAGNOSIS — J02.9 SORE THROAT: Primary | ICD-10-CM

## 2023-11-07 LAB
EXPIRATION DATE: NORMAL
FLUAV AG UPPER RESP QL IA.RAPID: NOT DETECTED
FLUBV AG UPPER RESP QL IA.RAPID: NOT DETECTED
HETEROPH AB SER QL LA: NEGATIVE
INTERNAL CONTROL: NORMAL
Lab: NORMAL
S PYO AG THROAT QL: NEGATIVE
SARS-COV-2 AG UPPER RESP QL IA.RAPID: NOT DETECTED

## 2023-11-07 NOTE — PROGRESS NOTES
Office Note     Name: Mikayla Nascimento    : 2005     MRN: [id number]     Chief Complaint  Sore Throat, Cough, Headache, and Chills    History of Present Illness:  Mikayla Nascimento is a 18 y.o. female who presents today for symptoms of sore throat, cough, headache, and chills.  She reports that her symptoms started yesterday.  She has also had diffuse body aches.  She has had chills but no measured fever.  She has felt fatigued.  She reports that she woke up in a sweat last night.  Her appetite has been decreased but she is continuing to drink well.  She reports painful swallowing but no difficulty swallowing, she is not drooling or having trouble handling secretions.  She had a small amount of dry cough.  She has no asthma or pulmonary history.  She reports that she felt a little nauseous last night but no vomiting.  She has had no diarrhea.  She is taking Claritin at home for her symptoms.      Subjective     Review of Systems:   Review of Systems   Constitutional:  Positive for appetite change, chills, fatigue and fever.   HENT:  Positive for sore throat and swollen glands. Negative for congestion, drooling, ear discharge, ear pain, rhinorrhea and trouble swallowing.    Respiratory:  Positive for cough. Negative for shortness of breath, wheezing and stridor.    Cardiovascular:  Negative for chest pain.   Gastrointestinal:  Positive for nausea. Negative for abdominal pain, constipation, diarrhea and vomiting.   Genitourinary:  Negative for dysuria.   Musculoskeletal:  Negative for neck pain.   Neurological:  Negative for dizziness, syncope, light-headedness and headache.       I have reviewed the patients family history, social history, past medical history, past surgical history and have updated it as appropriate.     Past Medical History:   Past Medical History:   Diagnosis Date    Abnormal menstrual periods     Anxiety     Constipation     Depression     Mood swings        Past Surgical History:    Past Surgical History:   Procedure Laterality Date    EAR TUBES      EYE SURGERY Bilateral     LAZY EYE    WISDOM TOOTH EXTRACTION      2019       Family History:   Family History   Problem Relation Age of Onset    Diabetes Mother     Hypertension Father     Lung cancer Father     No Known Problems Sister     No Known Problems Brother     Breast cancer Maternal Grandmother     No Known Problems Maternal Grandfather     Breast cancer Paternal Grandmother     Stroke Paternal Grandmother     Heart attack Paternal Grandfather     No Known Problems Daughter     No Known Problems Son     No Known Problems Maternal Aunt     No Known Problems Maternal Uncle     No Known Problems Paternal Aunt     No Known Problems Paternal Uncle     Breast cancer Other     Stroke Other     Diabetes Other     Hypertension Other     Heart attack Other        Social History:   Social History     Socioeconomic History    Marital status: Significant Other   Tobacco Use    Smoking status: Never     Passive exposure: Never    Smokeless tobacco: Never   Vaping Use    Vaping Use: Never used   Substance and Sexual Activity    Alcohol use: No    Drug use: Never    Sexual activity: Yes     Partners: Male     Birth control/protection: Condom, Vaginal contraceptive ring       Immunizations:   Immunization History   Administered Date(s) Administered    COVID-19 (PFIZER) Purple Cap Monovalent 04/07/2021, 04/29/2021, 12/06/2021    Fluzone (or Fluarix & Flulaval for VFC) >6mos 10/24/2018, 11/08/2022    Hep A, 2 Dose 10/24/2018    Hpv9 08/02/2016, 11/01/2016, 04/13/2017    Influenza Injectable Mdck Pf Quad 12/06/2021    Influenza, Unspecified 12/06/2021    Meningococcal Conjugate 02/13/2019    Tdap 03/21/2014        Medications:     Current Outpatient Medications:     NuvaRing 0.12-0.015 MG/24HR vaginal ring, INSERT 1 RING VAGINALLY FOR 3 WEEKS THEN REMOVE FOR 1 WEEK, Disp: 1 each, Rfl: 1    sertraline (ZOLOFT) 50 MG tablet, TAKE 1.5 TABLETS BY MOUTH  "DAILY., Disp: 135 tablet, Rfl: 0    Allergies:   Allergies   Allergen Reactions    Amoxicillin Rash       Objective     Vital Signs  Vitals:    11/07/23 1122   BP: 100/64   BP Location: Right arm   Patient Position: Sitting   Cuff Size: Adult   Pulse: 98   Resp: 16   Temp: 100.5 °F (38.1 °C)   TempSrc: Temporal   SpO2: 98%   Weight: 58.4 kg (128 lb 12.8 oz)   Height: 157.5 cm (62.01\")   PainSc:   6   PainLoc: Throat     Estimated body mass index is 23.55 kg/m² as calculated from the following:    Height as of this encounter: 157.5 cm (62.01\").    Weight as of this encounter: 58.4 kg (128 lb 12.8 oz).    BMI is within normal parameters. No other follow-up for BMI required.      Physical Exam  Vitals and nursing note reviewed.   Constitutional:       General: She is not in acute distress.     Appearance: Normal appearance. She is not ill-appearing, toxic-appearing or diaphoretic.   HENT:      Head: Normocephalic and atraumatic.      Right Ear: Ear canal and external ear normal. Tympanic membrane is not perforated, erythematous, retracted or bulging.      Left Ear: Ear canal and external ear normal. Tympanic membrane is not perforated, erythematous, retracted or bulging.      Nose: No congestion or rhinorrhea.      Mouth/Throat:      Mouth: Mucous membranes are moist.      Pharynx: Uvula midline. Posterior oropharyngeal erythema present. No pharyngeal swelling or oropharyngeal exudate.      Tonsils: No tonsillar exudate. 2+ on the right. 2+ on the left.   Eyes:      General:         Right eye: No discharge.         Left eye: No discharge.      Extraocular Movements: Extraocular movements intact.   Cardiovascular:      Rate and Rhythm: Normal rate and regular rhythm.      Heart sounds: No murmur heard.     No friction rub. No gallop.   Pulmonary:      Effort: No respiratory distress.      Breath sounds: No wheezing, rhonchi or rales.   Abdominal:      Tenderness: There is no abdominal tenderness. There is no guarding or " rebound.   Musculoskeletal:      Cervical back: Normal range of motion. No rigidity.   Lymphadenopathy:      Cervical: Cervical adenopathy (Bilateral anterior) present.   Skin:     General: Skin is warm.   Neurological:      Mental Status: She is alert.      Gait: Gait normal.   Psychiatric:         Mood and Affect: Mood normal.         Thought Content: Thought content normal.          Assessment and Plan     1. Sore throat  -She is negative for strep, COVID, influenza, and mono.  -We did discuss I am suspicious for a viral etiology at present time.  -I have recommended supportive care for now: Tylenol or Motrin as needed for fever or pain, throat lozenges, salt water gargles, Claritin and Flonase, and increasing water intake.  -Adhering to a bland diet may help prevent further episodes of nausea.  -If symptoms worsen, persist, or new symptoms develop, she has been advised to return to care.  - POC Rapid Strep A  - Covid-19 + Flu A&B AG, Veritor  - POC Infectious Mononucleosis Antibody       Follow Up  No follow-ups on file.    Mireya Crain PA-C  [facility] [place] [facility]

## 2024-02-26 DIAGNOSIS — F41.9 ANXIETY AND DEPRESSION: ICD-10-CM

## 2024-02-26 DIAGNOSIS — F32.A ANXIETY AND DEPRESSION: ICD-10-CM

## 2024-03-21 ENCOUNTER — OFFICE VISIT (OUTPATIENT)
Dept: FAMILY MEDICINE CLINIC | Facility: CLINIC | Age: 19
End: 2024-03-21
Payer: COMMERCIAL

## 2024-03-21 VITALS
DIASTOLIC BLOOD PRESSURE: 60 MMHG | TEMPERATURE: 97.3 F | BODY MASS INDEX: 25.32 KG/M2 | HEART RATE: 100 BPM | WEIGHT: 137.6 LBS | OXYGEN SATURATION: 96 % | SYSTOLIC BLOOD PRESSURE: 120 MMHG | HEIGHT: 62 IN | RESPIRATION RATE: 18 BRPM

## 2024-03-21 DIAGNOSIS — Z23 NEED FOR TDAP VACCINATION: ICD-10-CM

## 2024-03-21 DIAGNOSIS — J02.9 SORE THROAT: Primary | ICD-10-CM

## 2024-03-21 DIAGNOSIS — Z12.4 CERVICAL CANCER SCREENING: ICD-10-CM

## 2024-03-21 LAB
EXPIRATION DATE: NORMAL
EXPIRATION DATE: NORMAL
FLUAV AG NPH QL: NEGATIVE
FLUBV AG NPH QL: NEGATIVE
INTERNAL CONTROL: NORMAL
INTERNAL CONTROL: NORMAL
Lab: NORMAL
Lab: NORMAL
S PYO AG THROAT QL: NEGATIVE

## 2024-03-21 NOTE — PROGRESS NOTES
Office Note     Name: Mikayla Nascimento    : 2005     MRN: [id number]     Chief Complaint  Sore Throat (X 1 day.) and Headache (Neg covid test this morning.)    History of Present Illness:  Mikayla Nascimento is a 19 y.o. female who presents today for symptoms of sore throat. She reports she felt a tickle in her throat yesterday and it has progressively worsened. Her boyfriend has been sick recently as well. She also reports some brain fog. She took an at home COVID test and it was negative. She denies fever or chills. She is experiencing fatigue but appetite is unchanged. She reports mild postnasal drainage and congestion but no runny nose. No difficulty swallowing, no drooling. No SOA, wheezing, or cough. No GI symptoms. Reports mild body aches. Reports a mild headache, similar to sinus pressure. She has not yet taken any OTC medications.       Subjective     Review of Systems:   Review of Systems   Constitutional:  Positive for fatigue. Negative for appetite change, chills and fever.   HENT:  Positive for congestion and sore throat. Negative for postnasal drip, trouble swallowing and voice change.    Respiratory:  Negative for cough, chest tightness, shortness of breath and wheezing.    Cardiovascular:  Negative for chest pain.   Gastrointestinal:  Negative for abdominal pain, constipation, diarrhea, nausea and vomiting.   Musculoskeletal:  Positive for arthralgias and myalgias.   Neurological:  Positive for headache. Negative for dizziness, syncope, weakness and light-headedness.       I have reviewed the patients family history, social history, past medical history, past surgical history and have updated it as appropriate.     Past Medical History:   Past Medical History:   Diagnosis Date    Abnormal menstrual periods     Anxiety     Constipation     Depression     Mood swings        Past Surgical History:   Past Surgical History:   Procedure Laterality Date    EAR TUBES      EYE SURGERY Bilateral      LAZY EYE    WISDOM TOOTH EXTRACTION      2019       Family History:   Family History   Problem Relation Age of Onset    Diabetes Mother     Hypertension Father     Lung cancer Father     No Known Problems Sister     No Known Problems Brother     Breast cancer Maternal Grandmother     No Known Problems Maternal Grandfather     Breast cancer Paternal Grandmother     Stroke Paternal Grandmother     Heart attack Paternal Grandfather     No Known Problems Daughter     No Known Problems Son     No Known Problems Maternal Aunt     No Known Problems Maternal Uncle     No Known Problems Paternal Aunt     No Known Problems Paternal Uncle     Breast cancer Other     Stroke Other     Diabetes Other     Hypertension Other     Heart attack Other        Social History:   Social History     Socioeconomic History    Marital status: Significant Other   Tobacco Use    Smoking status: Never     Passive exposure: Never    Smokeless tobacco: Never   Vaping Use    Vaping status: Never Used   Substance and Sexual Activity    Alcohol use: No    Drug use: Never    Sexual activity: Yes     Partners: Male     Birth control/protection: Condom, Vaginal contraceptive ring       Immunizations:   Immunization History   Administered Date(s) Administered    COVID-19 (PFIZER) Purple Cap Monovalent 04/07/2021, 04/29/2021, 12/06/2021    Fluzone (or Fluarix & Flulaval for VFC) >6mos 10/24/2018, 11/08/2022    Hep A, 2 Dose 10/24/2018    Hpv9 08/02/2016, 11/01/2016, 04/13/2017    Influenza Injectable Mdck Pf Quad 12/06/2021    Influenza, Unspecified 12/06/2021    Meningococcal Conjugate 02/13/2019    Tdap 03/21/2014        Medications:     Current Outpatient Medications:     NuvaRing 0.12-0.015 MG/24HR vaginal ring, INSERT 1 RING VAGINALLY FOR 3 WEEKS THEN REMOVE FOR 1 WEEK, Disp: 1 each, Rfl: 1    sertraline (ZOLOFT) 50 MG tablet, TAKE 1 AND 1/2 TABLETS BY MOUTH EVERY DAY, Disp: 135 tablet, Rfl: 12    Allergies:   Allergies   Allergen Reactions     "Amoxicillin Rash       Objective     Vital Signs  Vitals:    03/21/24 1351   BP: 120/60   BP Location: Left arm   Patient Position: Sitting   Cuff Size: Adult   Pulse: 100   Resp: 18   Temp: 97.3 °F (36.3 °C)   TempSrc: Temporal   SpO2: 96%   Weight: 62.4 kg (137 lb 9.6 oz)   Height: 157.5 cm (62\")     Estimated body mass index is 25.17 kg/m² as calculated from the following:    Height as of this encounter: 157.5 cm (62\").    Weight as of this encounter: 62.4 kg (137 lb 9.6 oz).          Physical Exam  Vitals and nursing note reviewed.   Constitutional:       General: She is not in acute distress.     Appearance: Normal appearance. She is not ill-appearing, toxic-appearing or diaphoretic.   HENT:      Head: Normocephalic and atraumatic.      Right Ear: Ear canal and external ear normal. Tympanic membrane is not perforated, erythematous, retracted or bulging.      Left Ear: Ear canal and external ear normal. Tympanic membrane is not perforated, erythematous, retracted or bulging.      Nose: Congestion present. No rhinorrhea.      Mouth/Throat:      Pharynx: Uvula midline. Posterior oropharyngeal erythema present. No pharyngeal swelling or oropharyngeal exudate.      Tonsils: No tonsillar exudate. 2+ on the right. 2+ on the left.   Eyes:      General:         Right eye: No discharge.         Left eye: No discharge.      Extraocular Movements: Extraocular movements intact.   Cardiovascular:      Rate and Rhythm: Normal rate and regular rhythm.      Heart sounds: No murmur heard.     No friction rub. No gallop.   Pulmonary:      Effort: No respiratory distress.      Breath sounds: No wheezing, rhonchi or rales.   Musculoskeletal:      Cervical back: Normal range of motion. No rigidity.   Lymphadenopathy:      Cervical: No cervical adenopathy.   Skin:     General: Skin is warm.   Neurological:      Mental Status: She is alert.      Cranial Nerves: No dysarthria or facial asymmetry.      Motor: No tremor.      " Coordination: Coordination normal.      Gait: Gait normal.   Psychiatric:         Mood and Affect: Mood normal.         Thought Content: Thought content normal.          Assessment and Plan     Sore throat  -At home COVID test was negative.  In office strep and influenza test are negative.  Patient declines point-of-care mono testing.  Have advised supportive care for now: Zyrtec and Flonase to help with postnasal congestion, warm salt water gargles, Tylenol/ibuprofen for pain, and increasing water intake.  If symptoms persist, worsen, or new symptoms develop, she has been advised to return to care.    Cervical cancer screening  -Did discuss that at age 19, we start cervical cancer screening with Pap smears. Patient does follow with gynecology.  Have advised patient to schedule appointment here or with gynecologist for cervical cancer screening.    Need for Tdap vaccination  -Discussed with patient that she is due for Tdap vaccination.  Discussed purpose and risk/benefits.  Patient declines Tdap vaccine at present time.    Follow Up  Return if symptoms worsen or fail to improve.    Mireya Crain PA-C  [facility] [place] [facility] [place]

## 2024-07-23 ENCOUNTER — OFFICE VISIT (OUTPATIENT)
Dept: FAMILY MEDICINE CLINIC | Facility: CLINIC | Age: 19
End: 2024-07-23
Payer: COMMERCIAL

## 2024-07-23 VITALS
HEIGHT: 62 IN | BODY MASS INDEX: 24.11 KG/M2 | RESPIRATION RATE: 18 BRPM | HEART RATE: 81 BPM | SYSTOLIC BLOOD PRESSURE: 110 MMHG | OXYGEN SATURATION: 97 % | WEIGHT: 131 LBS | TEMPERATURE: 98 F | DIASTOLIC BLOOD PRESSURE: 70 MMHG

## 2024-07-23 DIAGNOSIS — R52 BODY ACHES: ICD-10-CM

## 2024-07-23 DIAGNOSIS — J02.9 SORE THROAT: Primary | ICD-10-CM

## 2024-07-23 DIAGNOSIS — J31.2 CHRONIC PHARYNGITIS: ICD-10-CM

## 2024-07-23 DIAGNOSIS — Z23 NEED FOR TDAP VACCINATION: ICD-10-CM

## 2024-07-23 DIAGNOSIS — Z12.4 CERVICAL CANCER SCREENING: ICD-10-CM

## 2024-07-23 PROCEDURE — 87205 SMEAR GRAM STAIN: CPT

## 2024-07-23 PROCEDURE — 99213 OFFICE O/P EST LOW 20 MIN: CPT

## 2024-07-23 PROCEDURE — 87070 CULTURE OTHR SPECIMN AEROBIC: CPT

## 2024-07-23 PROCEDURE — 87428 SARSCOV & INF VIR A&B AG IA: CPT

## 2024-07-23 PROCEDURE — 86308 HETEROPHILE ANTIBODY SCREEN: CPT

## 2024-07-23 PROCEDURE — 87880 STREP A ASSAY W/OPTIC: CPT

## 2024-07-23 PROCEDURE — 87186 SC STD MICRODIL/AGAR DIL: CPT

## 2024-07-23 PROCEDURE — 87147 CULTURE TYPE IMMUNOLOGIC: CPT

## 2024-07-23 NOTE — PROGRESS NOTES
Office Note     Name: Mikayla Nascimento    : 2005     MRN: [id number]     Chief Complaint  Chills, Sore Throat, Generalized Body Aches, and Nasal Congestion    History of Present Illness:  Mikayla Nascimento is a 19 y.o. female who presents today for symptoms of sore throat, chills, body aches, and congestion. She reports symptoms started 4-5 days ago. She reports that she did karaoke the night her symptoms started, and she thought the sore throat was due to that. She has also had some mild headaches. She also feels pressure in her ears. No measured fever. She reports that her appetite and energy level have been decreased. She is staying hydrated with water.  No drooling or trouble swallowing. Some dry cough. She denies wheezing or SOA. Denies chest pain. At home, she has taken ibuprofen for her symptoms. She denies known sick contacts. She does report recurring sore throats.       Subjective     Review of Systems:   Review of Systems   Constitutional:  Positive for appetite change, chills and fatigue. Negative for fever.   HENT:  Positive for congestion, ear pain and sore throat. Negative for trouble swallowing.    Respiratory:  Positive for cough. Negative for shortness of breath and wheezing.    Cardiovascular:  Negative for chest pain.   Gastrointestinal:  Negative for abdominal pain, constipation, diarrhea, nausea and vomiting.   Musculoskeletal:  Positive for arthralgias and myalgias.   Neurological:  Positive for headache. Negative for dizziness, syncope and light-headedness.       I have reviewed the patients family history, social history, past medical history, past surgical history and have updated it as appropriate.     Past Medical History:   Past Medical History:   Diagnosis Date   • Abnormal menstrual periods    • Anxiety    • Constipation    • Depression    • Mood swings        Past Surgical History:   Past Surgical History:   Procedure Laterality Date   • EAR TUBES     • EYE SURGERY  Bilateral     LAZY EYE   • WISDOM TOOTH EXTRACTION      2019       Family History:   Family History   Problem Relation Age of Onset   • Diabetes Mother    • Hypertension Father    • Lung cancer Father    • No Known Problems Sister    • No Known Problems Brother    • Breast cancer Maternal Grandmother    • No Known Problems Maternal Grandfather    • Breast cancer Paternal Grandmother    • Stroke Paternal Grandmother    • Heart attack Paternal Grandfather    • No Known Problems Daughter    • No Known Problems Son    • No Known Problems Maternal Aunt    • No Known Problems Maternal Uncle    • No Known Problems Paternal Aunt    • No Known Problems Paternal Uncle    • Breast cancer Other    • Stroke Other    • Diabetes Other    • Hypertension Other    • Heart attack Other        Social History:   Social History     Socioeconomic History   • Marital status: Significant Other   Tobacco Use   • Smoking status: Never     Passive exposure: Never   • Smokeless tobacco: Never   Vaping Use   • Vaping status: Never Used   Substance and Sexual Activity   • Alcohol use: No   • Drug use: Never   • Sexual activity: Yes     Partners: Male     Birth control/protection: Condom, Vaginal contraceptive ring       Immunizations:   Immunization History   Administered Date(s) Administered   • COVID-19 (PFIZER) Purple Cap Monovalent 04/07/2021, 04/29/2021, 12/06/2021   • Fluzone (or Fluarix & Flulaval for VFC) >6mos 10/24/2018, 11/08/2022   • Hep A, 2 Dose 10/24/2018   • Hpv9 08/02/2016, 11/01/2016, 04/13/2017   • Influenza Injectable Mdck Pf Quad 12/06/2021   • Influenza, Unspecified 12/06/2021   • Meningococcal Conjugate 02/13/2019   • Tdap 03/21/2014        Medications:     Current Outpatient Medications:   •  NuvaRing 0.12-0.015 MG/24HR vaginal ring, INSERT 1 RING VAGINALLY FOR 3 WEEKS THEN REMOVE FOR 1 WEEK, Disp: 1 each, Rfl: 1  •  sertraline (ZOLOFT) 50 MG tablet, TAKE 1 AND 1/2 TABLETS BY MOUTH EVERY DAY, Disp: 135 tablet, Rfl:  "12    Allergies:   Allergies   Allergen Reactions   • Amoxicillin Rash       Objective     Vital Signs  Vitals:    07/23/24 1019   BP: 110/70   BP Location: Right arm   Patient Position: Sitting   Cuff Size: Adult   Pulse: 81   Resp: 18   Temp: 98 °F (36.7 °C)   TempSrc: Temporal   SpO2: 97%   Weight: 59.4 kg (131 lb)   Height: 157.5 cm (62.01\")     Estimated body mass index is 23.95 kg/m² as calculated from the following:    Height as of this encounter: 157.5 cm (62.01\").    Weight as of this encounter: 59.4 kg (131 lb).    BMI is within normal parameters. No other follow-up for BMI required.      Physical Exam  Vitals and nursing note reviewed.   Constitutional:       General: She is not in acute distress.     Appearance: Normal appearance. She is not ill-appearing, toxic-appearing or diaphoretic.   HENT:      Head: Normocephalic and atraumatic.      Right Ear: Ear canal and external ear normal. Tympanic membrane is scarred. Tympanic membrane is not perforated, erythematous, retracted or bulging.      Left Ear: Ear canal and external ear normal. Tympanic membrane is scarred. Tympanic membrane is not perforated, erythematous, retracted or bulging.      Nose: No congestion or rhinorrhea.      Mouth/Throat:      Pharynx: Uvula midline. Posterior oropharyngeal erythema present. No pharyngeal swelling or oropharyngeal exudate.      Tonsils: No tonsillar exudate. 1+ on the right. 1+ on the left.   Eyes:      General:         Right eye: No discharge.         Left eye: No discharge.      Extraocular Movements: Extraocular movements intact.   Cardiovascular:      Rate and Rhythm: Normal rate and regular rhythm.      Heart sounds: No murmur heard.     No friction rub. No gallop.   Pulmonary:      Effort: No respiratory distress.      Breath sounds: No wheezing, rhonchi or rales.   Musculoskeletal:      Cervical back: Normal range of motion. No rigidity.   Lymphadenopathy:      Cervical: Cervical adenopathy present. "   Skin:     General: Skin is warm.   Neurological:      Mental Status: She is alert.      Gait: Gait normal.   Psychiatric:         Mood and Affect: Mood normal.         Thought Content: Thought content normal.          Assessment and Plan     1. Sore throat  -Point-of-care strep, mono, COVID, and flu testing all negative.  -Throat culture has been ordered.  -Discussed most likely etiology is viral.  I recommended supportive care for now.  Have recommended Tylenol or ibuprofen for symptoms, increase water intake, salt water gargles, Flonase to alleviate possible postnasal drainage.  -If symptoms persist, worsen, or new symptoms develop, she should return to care.  - Wound Culture - Wound, Oropharynx; Future    2. Body aches  -Point-of-care strep, mono, COVID, and flu testing are all negative.  -Did discuss supportive care options: Tylenol or ibuprofen, heating pads for body aches.  -If symptoms persist, worsen, or new symptoms develop, she has been advised to return to care.    3. Need for Tdap vaccination  -Did discuss that her Tdap is .  Discussed with patient I do not recommend vaccination today, as she is sick.  I have encouraged her to come back by the office when she is feeling well for update of Tdap vaccine.    4. Cervical cancer screening  -Discussed need for cervical cancer screening.  Encourage patient to make follow-up appointment with our office or schedule appointment with gynecology.    5. Chronic pharyngitis  -Patient does have recurrent episodes of pharyngitis.  She would like referral to ENT for further evaluation.  - Ambulatory Referral to ENT (Otolaryngology)  - Wound Culture - Wound, Oropharynx; Future       Follow Up  Return in about 1 month (around 2024), or if symptoms worsen or fail to improve, for Annual physical.    Mireya Crain PA-C  [facility] [place] [facility] [place]

## 2024-07-25 DIAGNOSIS — J02.0 STREP PHARYNGITIS: Primary | ICD-10-CM

## 2024-07-25 RX ORDER — CEPHALEXIN 500 MG/1
500 CAPSULE ORAL 2 TIMES DAILY
Qty: 20 CAPSULE | Refills: 0 | Status: SHIPPED | OUTPATIENT
Start: 2024-07-25 | End: 2024-08-04

## 2024-07-25 NOTE — PROGRESS NOTES
Patient was informed of results and verbalized good understanding and appreciation. She does not have any problems with cephalosporins. Please call into [facility]

## 2024-07-27 LAB
BACTERIA SPEC AEROBE CULT: ABNORMAL
BACTERIA SPEC AEROBE CULT: ABNORMAL
GRAM STN SPEC: ABNORMAL

## 2024-09-04 ENCOUNTER — OFFICE VISIT (OUTPATIENT)
Dept: FAMILY MEDICINE CLINIC | Facility: CLINIC | Age: 19
End: 2024-09-04
Payer: COMMERCIAL

## 2024-09-04 ENCOUNTER — LAB (OUTPATIENT)
Dept: FAMILY MEDICINE CLINIC | Facility: CLINIC | Age: 19
End: 2024-09-04
Payer: COMMERCIAL

## 2024-09-04 VITALS
WEIGHT: 130.4 LBS | TEMPERATURE: 98 F | DIASTOLIC BLOOD PRESSURE: 74 MMHG | OXYGEN SATURATION: 99 % | BODY MASS INDEX: 24 KG/M2 | HEIGHT: 62 IN | SYSTOLIC BLOOD PRESSURE: 110 MMHG | HEART RATE: 62 BPM | RESPIRATION RATE: 20 BRPM

## 2024-09-04 DIAGNOSIS — F41.9 ANXIETY AND DEPRESSION: ICD-10-CM

## 2024-09-04 DIAGNOSIS — F32.A ANXIETY AND DEPRESSION: ICD-10-CM

## 2024-09-04 DIAGNOSIS — Z00.00 WELL ADULT EXAM: ICD-10-CM

## 2024-09-04 DIAGNOSIS — Z00.00 WELL ADULT EXAM: Primary | ICD-10-CM

## 2024-09-04 DIAGNOSIS — Z23 NEED FOR TDAP VACCINATION: ICD-10-CM

## 2024-09-04 LAB
BILIRUB UR QL STRIP: NEGATIVE
CLARITY UR: ABNORMAL
COLOR UR: YELLOW
GLUCOSE UR STRIP-MCNC: NEGATIVE MG/DL
HGB UR QL STRIP.AUTO: NEGATIVE
HOLD SPECIMEN: NORMAL
KETONES UR QL STRIP: NEGATIVE
LEUKOCYTE ESTERASE UR QL STRIP.AUTO: ABNORMAL
NITRITE UR QL STRIP: NEGATIVE
PH UR STRIP.AUTO: 6 [PH] (ref 5–8)
PROT UR QL STRIP: ABNORMAL
SP GR UR STRIP: 1.03 (ref 1–1.03)
UROBILINOGEN UR QL STRIP: ABNORMAL

## 2024-09-04 PROCEDURE — 90471 IMMUNIZATION ADMIN: CPT | Performed by: FAMILY MEDICINE

## 2024-09-04 PROCEDURE — 87491 CHLMYD TRACH DNA AMP PROBE: CPT | Performed by: FAMILY MEDICINE

## 2024-09-04 PROCEDURE — 81001 URINALYSIS AUTO W/SCOPE: CPT | Performed by: FAMILY MEDICINE

## 2024-09-04 PROCEDURE — 87591 N.GONORRHOEAE DNA AMP PROB: CPT | Performed by: FAMILY MEDICINE

## 2024-09-04 PROCEDURE — 90715 TDAP VACCINE 7 YRS/> IM: CPT | Performed by: FAMILY MEDICINE

## 2024-09-04 PROCEDURE — 99395 PREV VISIT EST AGE 18-39: CPT | Performed by: FAMILY MEDICINE

## 2024-09-04 PROCEDURE — 99214 OFFICE O/P EST MOD 30 MIN: CPT | Performed by: FAMILY MEDICINE

## 2024-09-04 PROCEDURE — 87086 URINE CULTURE/COLONY COUNT: CPT | Performed by: FAMILY MEDICINE

## 2024-09-04 RX ORDER — BUSPIRONE HYDROCHLORIDE 5 MG/1
5 TABLET ORAL 3 TIMES DAILY
Qty: 90 TABLET | Refills: 3
Start: 2024-09-04

## 2024-09-04 NOTE — PROGRESS NOTES
Follow Up Office Visit      Date: 2024   Patient Name: Mikayla Nascimento  : 2005   MRN: [id number]     Chief Complaint:    Chief Complaint   Patient presents with    Annual Exam    Anxiety    Depression       History of Present Illness: Mikayla Nascimento is a 19 y.o. female who is here today for ***.    Subjective      Review of Systems:   Review of Systems   Constitutional:  Negative for activity change, appetite change and fatigue.   Respiratory:  Negative for cough, chest tightness, shortness of breath and wheezing.    Cardiovascular:  Negative for chest pain, palpitations and leg swelling.   Gastrointestinal:  Negative for abdominal distention, abdominal pain, blood in stool, constipation, diarrhea, nausea, vomiting, GERD and indigestion.   Genitourinary:  Negative for difficulty urinating, dysuria, flank pain, frequency, hematuria and urgency.   Musculoskeletal:  Negative for arthralgias, back pain, gait problem, joint swelling and myalgias.   Neurological:  Negative for dizziness, tremors, seizures, syncope, weakness, light-headedness, numbness, headache and memory problem.   Psychiatric/Behavioral:  Negative for sleep disturbance and depressed mood. The patient is not nervous/anxious.        I have reviewed the patients family history, social history, past medical history, past surgical history and have updated it as appropriate.     Medications:     Current Outpatient Medications:     NuvaRing 0.12-0.015 MG/24HR vaginal ring, INSERT 1 RING VAGINALLY FOR 3 WEEKS THEN REMOVE FOR 1 WEEK, Disp: 1 each, Rfl: 1    sertraline (ZOLOFT) 50 MG tablet, TAKE 1 AND 1/2 TABLETS BY MOUTH EVERY DAY, Disp: 135 tablet, Rfl: 12    busPIRone (BUSPAR) 5 MG tablet, Take 1 tablet by mouth 3 (Three) Times a Day., Disp: 90 tablet, Rfl: 3    Allergies:   Allergies   Allergen Reactions    Amoxicillin Rash       Immunizations:   Immunization History   Administered Date(s) Administered    COVID-19 (PFIZER) Purple  "Cap Monovalent 04/07/2021, 04/29/2021, 12/06/2021    Fluzone (or Fluarix & Flulaval for VFC) >6mos 10/24/2018, 11/08/2022    Hep A, 2 Dose 10/24/2018    Hpv9 08/02/2016, 11/01/2016, 04/13/2017    Influenza Injectable Mdck Pf Quad 12/06/2021    Influenza, Unspecified 12/06/2021    Meningococcal Conjugate 02/13/2019    Tdap 03/21/2014        Objective     Physical Exam: Please see above  Vital Signs:   Vitals:    09/04/24 0926   BP: 110/74   BP Location: Left arm   Patient Position: Sitting   Cuff Size: Adult   Pulse: 62   Resp: 20   Temp: 98 °F (36.7 °C)   TempSrc: Temporal   SpO2: 99%   Weight: 59.1 kg (130 lb 6.4 oz)   Height: 157.5 cm (62\")     Body mass index is 23.85 kg/m².  BMI is within normal parameters. No other follow-up for BMI required.       Physical Exam  Vitals and nursing note reviewed.   Constitutional:       Appearance: Normal appearance.   HENT:      Head: Normocephalic and atraumatic.      Nose: Nose normal.      Mouth/Throat:      Pharynx: Oropharynx is clear.   Eyes:      Extraocular Movements: Extraocular movements intact.      Pupils: Pupils are equal, round, and reactive to light.   Neck:      Thyroid: No thyroid mass or thyromegaly.      Trachea: Trachea normal.   Cardiovascular:      Rate and Rhythm: Normal rate and regular rhythm.      Pulses: Normal pulses. No decreased pulses.      Heart sounds: Normal heart sounds.   Pulmonary:      Effort: Pulmonary effort is normal.      Breath sounds: Normal breath sounds.   Abdominal:      General: Abdomen is flat. Bowel sounds are normal.      Palpations: Abdomen is soft.      Tenderness: There is no abdominal tenderness.   Musculoskeletal:      Cervical back: Neck supple.      Right lower leg: No edema.      Left lower leg: No edema.   Lymphadenopathy:      Cervical: No cervical adenopathy.   Skin:     General: Skin is warm and dry.   Neurological:      General: No focal deficit present.      Mental Status: She is alert and oriented to person, " place, and time.      Sensory: Sensation is intact.      Motor: Motor function is intact.      Coordination: Coordination is intact.   Psychiatric:         Attention and Perception: Attention normal.         Mood and Affect: Mood normal.         Speech: Speech normal.         Behavior: Behavior normal.         Procedures    Results:   Labs:   Hemoglobin A1C   Date Value Ref Range Status   05/23/2023 4.80 4.80 - 5.60 % Final     TSH   Date Value Ref Range Status   05/23/2023 0.609 0.270 - 4.200 uIU/mL Final        POCT Results (if applicable):   Results for orders placed or performed in visit on 07/23/24   Wound Culture - Wound, Oropharynx    Specimen: Oropharynx; Wound   Result Value Ref Range    Wound Culture (A)      Heavy growth (4+) Streptococcus dysgalactiae ssp equisimilis    Wound Culture Moderate growth (3+) Normal Throat Brenda     Gram Stain Few (2+) Gram positive cocci in chains        Susceptibility    Streptococcus dysgalactiae ssp equisimilis - GIANFRANCO     Ceftriaxone  Susceptible ug/ml     Clindamycin  Resistant ug/ml     Levofloxacin  Susceptible ug/ml     Penicillin G  Susceptible ug/ml     Vancomycin  Susceptible ug/ml   POCT SARS-CoV-2 Antigen GUILLERMINA + Flu    Specimen: Swab   Result Value Ref Range    SARS Antigen Not Detected Not Detected, Presumptive Negative    Influenza A Antigen GUILLERMINA Not Detected Not Detected    Influenza B Antigen GUILLERMINA Not Detected Not Detected    Internal Control Passed Passed    Lot Number [id number]     Expiration Date 02/15/2025    POCT rapid strep A    Specimen: Swab   Result Value Ref Range    Rapid Strep A Screen Negative Negative, VALID, INVALID, Not Performed    Internal Control Passed Passed    Lot Number [id number]     Expiration Date 07/23/2024    POCT Infectious mononucleosis antibody    Specimen: Blood   Result Value Ref Range    Monospot Negative Negative    Internal Control Passed Passed    Lot Number [id number]     Expiration Date 05/31/2025        Imaging:   No valid  procedures specified.     Measures:   Advanced Care Planning:   Patient does not have an advance directive, information provided.    Smoking Cessation:   Non-smoker.    Assessment / Plan      Assessment/Plan:   Diagnoses and all orders for this visit:    1. Well adult exam (Primary)  -     Chlamydia trachomatis, Neisseria gonorrhoeae, PCR - Urine, Urine, Clean Catch; Future  -     Urinalysis With Culture If Indicated -; Future    2. Need for Tdap vaccination  -     Tdap Vaccine Greater Than or Equal To 6yo IM    3. Anxiety and depression  -     busPIRone (BUSPAR) 5 MG tablet; Take 1 tablet by mouth 3 (Three) Times a Day.  Dispense: 90 tablet; Refill: 3        Follow Up:   Return in about 6 months (around 3/4/2025).      At [facility], we believe that sharing information builds trust and better relationships. You are receiving this note because you recently visited [facility]. It is possible you will see health information before a provider has talked with you about it. This kind of information can be easy to misunderstand. To help you fully understand what it means for your health, we urge you to discuss this note with your provider.    Desmond Hays MD  [facility]

## 2024-09-04 NOTE — PROGRESS NOTES
Female Physical Note      Date: 2024   Patient Name: Mikayla Nascimento  : 2005   MRN: [id number]     Chief Complaint:    Chief Complaint   Patient presents with    Annual Exam    Anxiety    Depression       History of Present Illness: Mikayla Nascimenot is a 19 y.o. female who is here today for their annual health maintenance and physical.  Patient has been doing relatively well since last being seen.  She feels that the Zoloft is beneficial but she has had a few episodes of anxiety/panic attacks since the loss of her father.  She is doing better overall and feels that the medication is helpful.  She does continue with her other medications as prescribed.  She is following up with gynecology and has been doing relatively well.  She denies any side effects of the 75 mg of Zoloft at present time.  Patient appears to be doing otherwise well.  They have continue with their medications without any side effects.  They have not had any changes in their usual activity, appetite and sleep.  Patient denies any other cardiovascular, respiratory, gastrointestinal, urologic or neurologic complaints.      Subjective      Review of Systems:   Review of Systems   Constitutional:  Negative for activity change, appetite change and fatigue.   Respiratory:  Negative for cough, chest tightness, shortness of breath and wheezing.    Cardiovascular:  Negative for chest pain, palpitations and leg swelling.   Gastrointestinal:  Negative for abdominal distention, abdominal pain, blood in stool, constipation, diarrhea, nausea, vomiting, GERD and indigestion.   Genitourinary:  Negative for difficulty urinating, dysuria, flank pain, frequency, hematuria and urgency.   Musculoskeletal:  Negative for arthralgias, back pain, gait problem, joint swelling and myalgias.   Neurological:  Negative for dizziness, tremors, seizures, syncope, weakness, light-headedness, numbness, headache and memory problem.   Psychiatric/Behavioral:   Negative for sleep disturbance and depressed mood. The patient is not nervous/anxious.        Past Medical History, Social History, Family History and Care Team were all reviewed with patient and updated as appropriate.     Medications:     Current Outpatient Medications:     NuvaRing 0.12-0.015 MG/24HR vaginal ring, INSERT 1 RING VAGINALLY FOR 3 WEEKS THEN REMOVE FOR 1 WEEK, Disp: 1 each, Rfl: 1    sertraline (ZOLOFT) 50 MG tablet, TAKE 1 AND 1/2 TABLETS BY MOUTH EVERY DAY, Disp: 135 tablet, Rfl: 12    busPIRone (BUSPAR) 5 MG tablet, Take 1 tablet by mouth 3 (Three) Times a Day., Disp: 90 tablet, Rfl: 3    Allergies:   Allergies   Allergen Reactions    Amoxicillin Rash       Immunizations:  Health Maintenance Summary            Overdue - PAP SMEAR (Every 3 Years) Never done      No completion, postpone, or frequency change history exists for this topic.              Ordered - CHLAMYDIA SCREENING (Yearly) Ordered on 9/4/2024 05/23/2023  Chlamydia trachomatis, JASEN component of Chlamydia trachomatis, Neisseria gonorrhoeae, PCR - Urine, Urine, Random Void    12/10/2021  Chlamydia trachomatis, JASEN component of NuSwab VG+ - Swab, Vagina    11/01/2018  Chlamydia trachomatis, JASEN component of NuSwab VG+ - Swab, Vagina              Postponed - INFLUENZA VACCINE (Yearly - August to March) Postponed until 3/31/2025      09/04/2024  Postponed until 3/31/2025 by Allie Crain MA (Product Unavailable)    03/21/2024  Postponed until 3/31/2024 by Allie Crain MA (Patient Refused)    11/08/2022  Imm Admin: Fluzone (or Fluarix & Flulaval for VFC) >6mos    12/06/2021  Imm Admin: Influenza, Unspecified    12/06/2021  Imm Admin: Influenza Injectable Mdck Pf Quad    Only the first 5 history entries have been loaded, but more history exists.              Postponed - COVID-19 Vaccine (4 - 2023-24 season) Postponed until 9/4/2025 09/04/2024  Postponed until 9/4/2025 by Allie Crain MA (Patient Refused)    03/21/2024  Postponed  "until 3/21/2025 by Allie Crain MA (Patient Refused)    08/10/2023  Postponed until 8/12/2024 by Desmond Hays MD (Patient Refused)    11/08/2022  Postponed until 2/28/2023 by Kelly Crain PA-C (Patient Refused)    12/06/2021  Imm Admin: COVID-19 (PFIZER) Purple Cap Monovalent    Only the first 5 history entries have been loaded, but more history exists.              ANNUAL PHYSICAL (Yearly) Next due on 9/4/2025 09/04/2024  Done    05/22/2023  Done              TDAP/TD VACCINES (3 - Td or Tdap) Next due on 9/4/2034 09/04/2024  Imm Admin: Tdap    03/21/2014  Imm Admin: Tdap              HPV VACCINES (Series Information) Completed      04/13/2017  Imm Admin: Hpv9    11/01/2016  Imm Admin: Hpv9    08/02/2016  Imm Admin: Hpv9              MENINGOCOCCAL VACCINE (Series Information) Completed      02/13/2019  Imm Admin: Meningococcal Conjugate              HEPATITIS C SCREENING  Completed      05/23/2023  Hepatitis C Antibody              Pneumococcal Vaccine 0-64 (Series Information) Aged Out      No completion, postpone, or frequency change history exists for this topic.                     Orders Placed This Encounter   Procedures    Tdap Vaccine Greater Than or Equal To 6yo IM        Colorectal Screening:   Deferred.  Last Completed Colonoscopy       This patient has no relevant Health Maintenance data.          Pap: Advised.  Patient does have an appointment with Dr. Crissy Parra in the near future.  Last Completed Pap Smear       This patient has no relevant Health Maintenance data.           Mammogram: Deferred.  Last Completed Mammogram       This patient has no relevant Health Maintenance data.             CT for Smoker (Age 50-80, 20 pk yr):   Deferred.  Bone Density/DEXA (Age 65 or high risk): Deferred.  Hep C (Age 18-79 once): Previously ordered.  HIV (Age 15-65 once): No results found for: \"HIV1X2\"  A1c:   Hemoglobin A1C   Date Value Ref Range Status   05/23/2023 4.80 4.80 - 5.60 % " "Final      Lipid panel:  No results found for: \"LIPIDEXCLUSI\"    The ASCVD Risk score (Anjana DK, et al., 2019) failed to calculate for the following reasons:    The 2019 ASCVD risk score is only valid for ages 40 to 79    Dermatology: Advised if necessary.  Ophthalmologist: Up-to-date.  Dentist: Up-to-date.    Tobacco Use: Low Risk  (9/4/2024)    Patient History     Smoking Tobacco Use: Never     Smokeless Tobacco Use: Never     Passive Exposure: Never       Social History     Substance and Sexual Activity   Alcohol Use No        Social History     Substance and Sexual Activity   Drug Use Never        Diet/Physical activity: Well-balanced diet/daily exercise.    Sexual Health: No issues at present time.   Menopause: No issues at present time.  Menstrual Cycles: No issues at present time.    Depression: PHQ-2 Depression Screening  PHQ-9 Total Score: 0     Measures:   Advanced Care Planning:   Did not discuss.    Smoking Cessation:   Non-smoker.    Objective     Physical Exam:  Vital Signs:   Vitals:    09/04/24 0926   BP: 110/74   BP Location: Left arm   Patient Position: Sitting   Cuff Size: Adult   Pulse: 62   Resp: 20   Temp: 98 °F (36.7 °C)   TempSrc: Temporal   SpO2: 99%   Weight: 59.1 kg (130 lb 6.4 oz)   Height: 157.5 cm (62\")     Body mass index is 23.85 kg/m².   Facility age limit for growth %laura is 20 years.    Physical Exam  Vitals and nursing note reviewed.   Constitutional:       Appearance: Normal appearance.   HENT:      Head: Normocephalic and atraumatic.      Nose: Nose normal.      Mouth/Throat:      Pharynx: Oropharynx is clear.   Eyes:      Extraocular Movements: Extraocular movements intact.      Pupils: Pupils are equal, round, and reactive to light.   Neck:      Thyroid: No thyroid mass or thyromegaly.      Trachea: Trachea normal.   Cardiovascular:      Rate and Rhythm: Normal rate and regular rhythm.      Pulses: Normal pulses. No decreased pulses.      Heart sounds: Normal heart sounds. "   Pulmonary:      Effort: Pulmonary effort is normal.      Breath sounds: Normal breath sounds.   Abdominal:      General: Abdomen is flat. Bowel sounds are normal.      Palpations: Abdomen is soft.      Tenderness: There is no abdominal tenderness.   Musculoskeletal:      Cervical back: Neck supple.      Right lower leg: No edema.      Left lower leg: No edema.   Lymphadenopathy:      Cervical: No cervical adenopathy.   Skin:     General: Skin is warm and dry.   Neurological:      General: No focal deficit present.      Mental Status: She is alert and oriented to person, place, and time.      Sensory: Sensation is intact.      Motor: Motor function is intact.      Coordination: Coordination is intact.   Psychiatric:         Attention and Perception: Attention normal.         Mood and Affect: Mood normal.         Speech: Speech normal.         Behavior: Behavior normal.         POCT Results (if applicable);   Results for orders placed or performed in visit on 07/23/24   Wound Culture - Wound, Oropharynx    Specimen: Oropharynx; Wound   Result Value Ref Range    Wound Culture (A)      Heavy growth (4+) Streptococcus dysgalactiae ssp equisimilis    Wound Culture Moderate growth (3+) Normal Throat Brenda     Gram Stain Few (2+) Gram positive cocci in chains        Susceptibility    Streptococcus dysgalactiae ssp equisimilis - GIANFRANCO     Ceftriaxone  Susceptible ug/ml     Clindamycin  Resistant ug/ml     Levofloxacin  Susceptible ug/ml     Penicillin G  Susceptible ug/ml     Vancomycin  Susceptible ug/ml   POCT SARS-CoV-2 Antigen GUILLERMINA + Flu    Specimen: Swab   Result Value Ref Range    SARS Antigen Not Detected Not Detected, Presumptive Negative    Influenza A Antigen GUILLERMINA Not Detected Not Detected    Influenza B Antigen GUILLERMINA Not Detected Not Detected    Internal Control Passed Passed    Lot Number [id number]     Expiration Date 02/15/2025    POCT rapid strep A    Specimen: Swab   Result Value Ref Range    Rapid Strep A Screen  Negative Negative, VALID, INVALID, Not Performed    Internal Control Passed Passed    Lot Number [id number]     Expiration Date 07/23/2024    POCT Infectious mononucleosis antibody    Specimen: Blood   Result Value Ref Range    Monospot Negative Negative    Internal Control Passed Passed    Lot Number [id number]     Expiration Date 05/31/2025         Procedures    Assessment / Plan      Assessment/Plan:   Diagnoses and all orders for this visit:    1. Well adult exam (Primary)  Patient did have a wellness exam performed today that did not reveal any abnormality.  Patient's anxiety/depression scores were reviewed.  We will continue to monitor laboratory data as well as symptomatology and if there are any other questions or concerns prior to the next scheduled follow-up they will contact us.  -     Chlamydia trachomatis, Neisseria gonorrhoeae, PCR - Urine, Urine, Clean Catch; Future  -     Urinalysis With Culture If Indicated -; Future    2. Need for Tdap vaccination  Patient is due for Tdap.  She will receive this today.  -     Tdap Vaccine Greater Than or Equal To 8yo IM    3. Anxiety and depression  Patient appears to be doing well at present time with respect to their anxiety.  They are tolerating their medications and other treatment plans without difficulty.  We will continue with current regimen and if they have any other problems or complaints prior to her next scheduled follow-up they will contact us.  Adjustments of medications or referral to a behavioral health specialist may be necessary in the future.  Patient has had little bit of problems with anxiety/panic attacks.  This could be result of PTSD secondary to her recent loss of her father.  We have encouraged her to try BuSpar 5 mg when she has the episodes to see if it would benefit.  She will not making changes with respect to her Zoloft at present time.  -     busPIRone (BUSPAR) 5 MG tablet; Take 1 tablet by mouth 3 (Three) Times a Day.  Dispense: 90  tablet; Refill: 3         Healthcare Maintenance:  Counseling provided based on age appropriate USPSTF guidelines.  BMI is within normal parameters. No other follow-up for BMI required.    Mikayla Nascimento voices understanding and acceptance of this advice and will call back with any further questions or concerns. AVS with preventive healthcare tips printed for patient.     Follow Up:   Return in about 6 months (around 3/4/2025).      At [facility], we believe that sharing information builds trust and better relationships. You are receiving this note because you recently visited [facility]. It is possible you will see health information before a provider has talked with you about it. This kind of information can be easy to misunderstand. To help you fully understand what it means for your health, we urge you to discuss this note with your provider.    Desmond Hays MD  [facility]

## 2024-09-05 LAB
BACTERIA UR QL AUTO: ABNORMAL /HPF
COD CRY URNS QL: ABNORMAL /HPF
HYALINE CASTS UR QL AUTO: ABNORMAL /LPF
RBC # UR STRIP: ABNORMAL /HPF
REF LAB TEST METHOD: ABNORMAL
SQUAMOUS #/AREA URNS HPF: ABNORMAL /HPF
WBC # UR STRIP: ABNORMAL /HPF

## 2024-09-06 LAB
BACTERIA SPEC AEROBE CULT: NO GROWTH
C TRACH RRNA SPEC QL NAA+PROBE: NEGATIVE
N GONORRHOEA RRNA SPEC QL NAA+PROBE: NEGATIVE

## 2024-09-09 NOTE — PROGRESS NOTES
Contacted patient to speak about results. Patient verbalized good understanding and appreciation. No questions or concerns.

## 2024-09-11 ENCOUNTER — TELEPHONE (OUTPATIENT)
Dept: FAMILY MEDICINE CLINIC | Facility: CLINIC | Age: 19
End: 2024-09-11
Payer: COMMERCIAL

## 2024-09-11 NOTE — TELEPHONE ENCOUNTER
Relay      Message left for patient, following up on current referral appointment status. Request to have patient contact pcp/referral department. Does patient like to complete current referral to be seen by Dr Kev Cameron at [facility]? Would patient wish to close current referral?

## 2025-03-04 ENCOUNTER — PATIENT ROUNDING (BHMG ONLY) (OUTPATIENT)
Dept: FAMILY MEDICINE CLINIC | Facility: CLINIC | Age: 20
End: 2025-03-04
Payer: COMMERCIAL

## 2025-03-04 ENCOUNTER — OFFICE VISIT (OUTPATIENT)
Dept: FAMILY MEDICINE CLINIC | Facility: CLINIC | Age: 20
End: 2025-03-04
Payer: COMMERCIAL

## 2025-03-04 VITALS
SYSTOLIC BLOOD PRESSURE: 106 MMHG | OXYGEN SATURATION: 98 % | RESPIRATION RATE: 18 BRPM | HEIGHT: 62 IN | HEART RATE: 81 BPM | DIASTOLIC BLOOD PRESSURE: 70 MMHG | WEIGHT: 135 LBS | TEMPERATURE: 98 F | BODY MASS INDEX: 24.84 KG/M2

## 2025-03-04 DIAGNOSIS — Z28.21 IMMUNIZATION DECLINED: ICD-10-CM

## 2025-03-04 DIAGNOSIS — F41.9 ANXIETY AND DEPRESSION: Primary | ICD-10-CM

## 2025-03-04 DIAGNOSIS — F32.A ANXIETY AND DEPRESSION: Primary | ICD-10-CM

## 2025-03-04 PROCEDURE — 99214 OFFICE O/P EST MOD 30 MIN: CPT | Performed by: FAMILY MEDICINE

## 2025-03-04 RX ORDER — SERTRALINE HYDROCHLORIDE 100 MG/1
100 TABLET, FILM COATED ORAL DAILY
Qty: 90 TABLET | Refills: 3 | Status: SHIPPED | OUTPATIENT
Start: 2025-03-04

## 2025-03-04 NOTE — PROGRESS NOTES
Follow Up Office Visit      Date: 2025   Patient Name: Mikayla Nascimento  : 2005   MRN: [id number]     Chief Complaint:    Chief Complaint   Patient presents with    Primary Care Follow-Up    Med Refill       History of Present Illness: Mikayla Nascimento is a 20 y.o. female who is here today for follow-up.    History of Present Illness  The patient is a 20-year-old female who presents for evaluation of anxiety.    She has been experiencing an increase in anxiety, which she attributes to situational factors. These episodes occur several times a week, with 2 panic attacks reported in the previous week. One of these attacks was triggered by an unexpected event while watching a program. She is currently on a regimen of Zoloft 75 mg, taking 1.5 tablets daily. Additionally, she uses BuSpar intermittently, finding it effective in managing her symptoms. She reports that BuSpar helps her feel more focused and grounded, particularly when she experiences anxiety or distress.    She has not yet undergone a Pap smear. She was prescribed NuvaRing by her gynecologist at the age of 17.    SOCIAL HISTORY  She is currently living with her boyfriend and mother.    MEDICATIONS  Current: Zoloft, BuSpar, NuvaRing     Patient appears to be doing otherwise well.  They have continue with their medications without any side effects.  They have not had any changes in their usual activity, appetite and sleep.  Patient denies any other cardiovascular, respiratory, gastrointestinal, urologic or neurologic complaints.    Subjective      Review of Systems:   Review of Systems   Constitutional:  Negative for activity change, appetite change and fatigue.   Respiratory:  Negative for cough, chest tightness, shortness of breath and wheezing.    Cardiovascular:  Negative for chest pain, palpitations and leg swelling.   Gastrointestinal:  Negative for abdominal distention, abdominal pain, blood in stool, constipation, diarrhea,  "nausea, vomiting, GERD and indigestion.   Genitourinary:  Negative for difficulty urinating, dysuria, flank pain, frequency, hematuria and urgency.   Musculoskeletal:  Negative for arthralgias, back pain, gait problem, joint swelling and myalgias.   Neurological:  Negative for dizziness, tremors, seizures, syncope, weakness, light-headedness, numbness, headache and memory problem.   Psychiatric/Behavioral:  Negative for sleep disturbance and depressed mood. The patient is not nervous/anxious.        I have reviewed the patients family history, social history, past medical history, past surgical history and have updated it as appropriate.     Medications:     Current Outpatient Medications:     busPIRone (BUSPAR) 5 MG tablet, Take 1 tablet by mouth 3 (Three) Times a Day., Disp: 90 tablet, Rfl: 3    NuvaRing 0.12-0.015 MG/24HR vaginal ring, INSERT 1 RING VAGINALLY FOR 3 WEEKS THEN REMOVE FOR 1 WEEK, Disp: 1 each, Rfl: 1    sertraline (ZOLOFT) 100 MG tablet, Take 1 tablet by mouth Daily., Disp: 90 tablet, Rfl: 3    Allergies:   Allergies   Allergen Reactions    Amoxicillin Rash       Immunizations:   Immunization History   Administered Date(s) Administered    COVID-19 (PFIZER) Purple Cap Monovalent 04/07/2021, 04/29/2021, 12/06/2021    Fluzone (or Fluarix & Flulaval for VFC) >6mos 10/24/2018, 11/08/2022    Hep A, 2 Dose 10/24/2018    Hpv9 08/02/2016, 11/01/2016, 04/13/2017    Influenza Injectable Mdck Pf Quad 12/06/2021    Influenza, Unspecified 12/06/2021    Meningococcal Conjugate 02/13/2019    Tdap 03/21/2014, 09/04/2024        Objective     Physical Exam: Please see above  Vital Signs:   Vitals:    03/04/25 1103   BP: 106/70   BP Location: Left arm   Patient Position: Sitting   Cuff Size: Adult   Pulse: 81   Resp: 18   Temp: 98 °F (36.7 °C)   TempSrc: Temporal   SpO2: 98%   Weight: 61.2 kg (135 lb)   Height: 157.5 cm (62.01\")     Body mass index is 24.69 kg/m².  BMI is within normal parameters. No other follow-up " for BMI required.       Physical Exam  Vitals and nursing note reviewed.   Constitutional:       Appearance: Normal appearance.   HENT:      Head: Normocephalic and atraumatic.      Nose: Nose normal.      Mouth/Throat:      Pharynx: Oropharynx is clear.   Eyes:      Extraocular Movements: Extraocular movements intact.      Pupils: Pupils are equal, round, and reactive to light.   Neck:      Thyroid: No thyroid mass or thyromegaly.      Trachea: Trachea normal.   Cardiovascular:      Rate and Rhythm: Normal rate and regular rhythm.      Pulses: Normal pulses. No decreased pulses.      Heart sounds: Normal heart sounds.   Pulmonary:      Effort: Pulmonary effort is normal.      Breath sounds: Normal breath sounds.   Abdominal:      General: Abdomen is flat. Bowel sounds are normal.      Palpations: Abdomen is soft.      Tenderness: There is no abdominal tenderness.   Musculoskeletal:      Cervical back: Neck supple.      Right lower leg: No edema.      Left lower leg: No edema.   Lymphadenopathy:      Cervical: No cervical adenopathy.   Skin:     General: Skin is warm and dry.   Neurological:      General: No focal deficit present.      Mental Status: She is alert and oriented to person, place, and time.      Sensory: Sensation is intact.      Motor: Motor function is intact.      Coordination: Coordination is intact.   Psychiatric:         Attention and Perception: Attention normal.         Mood and Affect: Mood normal.         Speech: Speech normal.         Behavior: Behavior normal.         Procedures    Results:   Labs:   Hemoglobin A1C   Date Value Ref Range Status   05/23/2023 4.80 4.80 - 5.60 % Final     TSH   Date Value Ref Range Status   05/23/2023 0.609 0.270 - 4.200 uIU/mL Final        POCT Results (if applicable):   Results for orders placed or performed in visit on 09/04/24   Chlamydia trachomatis, Neisseria gonorrhoeae, PCR - Urine, Urine, Clean Catch    Collection Time: 09/04/24 10:11 AM    Specimen:  Urine, Clean Catch   Result Value Ref Range    Chlamydia trachomatis, JASEN Negative Negative    Neisseria gonorrhoeae, JASEN Negative Negative   Urine Culture - Urine, Urine, Clean Catch    Collection Time: 09/04/24 10:11 AM    Specimen: Urine, Clean Catch   Result Value Ref Range    Urine Culture No growth    Urinalysis With Culture If Indicated - Urine, Clean Catch    Collection Time: 09/04/24 10:11 AM    Specimen: Urine, Clean Catch   Result Value Ref Range    Color, UA Yellow Yellow, Straw    Appearance, UA Turbid (A) Clear    pH, UA 6.0 5.0 - 8.0    Specific Gravity, UA 1.029 1.005 - 1.030    Glucose, UA Negative Negative    Ketones, UA Negative Negative    Bilirubin, UA Negative Negative    Blood, UA Negative Negative    Protein, UA Trace (A) Negative    Leuk Esterase, UA Small (1+) (A) Negative    Nitrite, UA Negative Negative    Urobilinogen, UA 1.0 E.U./dL 0.2 - 1.0 E.U./dL   [place] Urine Culture Tube - Urine, Clean Catch    Collection Time: 09/04/24 10:11 AM    Specimen: Urine, Clean Catch   Result Value Ref Range    Extra Tube Hold for add-ons.    Urinalysis, Microscopic Only - Urine, Clean Catch    Collection Time: 09/04/24 10:11 AM    Specimen: Urine, Clean Catch   Result Value Ref Range    RBC, UA None Seen None Seen, 0-2 /HPF    WBC, UA 6-10 (A) None Seen, 0-2 /HPF    Bacteria, UA 3+ (A) None Seen /HPF    Squamous Epithelial Cells, UA 3-6 (A) None Seen, 0-2 /HPF    Hyaline Casts, UA None Seen None Seen /LPF    Calcium Oxalate Crystals, UA Small/1+ None Seen /HPF    Methodology Manual Light Microscopy        Imaging:   No valid procedures specified.     Measures:   Advanced Care Planning:   Did not discuss    Smoking Cessation:   Non-smoker    Assessment / Plan      Assessment/Plan:   Diagnoses and all orders for this visit:    1. Anxiety and depression (Primary)  Patient appears to be doing well at present time with respect to their anxiety.  They are tolerating their medications and other treatment  plans without difficulty.  We will continue with current regimen and if they have any other problems or complaints prior to her next scheduled follow-up they will contact us.  Adjustments of medications or referral to a behavioral health specialist may be necessary in the future.  Patient is having some worsening symptoms of panic attacks.  She wishes to continue with BuSpar but we will increase her Zoloft up to 100 mg daily.  She will monitor her symptoms and if they do worsen.  Will continue to increase to a maximum dose of 200 mg daily.  Chronic condition is going to take close monitoring.  -     sertraline (ZOLOFT) 100 MG tablet; Take 1 tablet by mouth Daily.  Dispense: 90 tablet; Refill: 3    2. Immunization declined   Patient would benefit from having immunizations given.  Patient has elected not to receive the recommended vaccines at present time.  They understand the risk of not receiving these vaccine and the disease in which they may incur.  We have discussed other options and will pursue with encouragement of compliance with future appointments.  If patient does change their minds prior to the next scheduled follow-up, they may contact us and we will provide immunization at that time.      Follow Up:   Return in about 6 months (around 9/4/2025).      At [facility], we believe that sharing information builds trust and better relationships. You are receiving this note because you recently visited [facility]. It is possible you will see health information before a provider has talked with you about it. This kind of information can be easy to misunderstand. To help you fully understand what it means for your health, we urge you to discuss this note with your provider.    Desmond Hays MD  [facility]    Patient or patient representative verbalized consent for the use of Ambient Listening during the visit with  Desmond Hays MD for chart documentation. 3/4/2025  11:11 EST

## 2025-03-04 NOTE — PROGRESS NOTES
A [employer] message has been sent to the patient for PATIENT  ROUNDING with [facility] [place]